# Patient Record
Sex: FEMALE | Race: OTHER | NOT HISPANIC OR LATINO | URBAN - METROPOLITAN AREA
[De-identification: names, ages, dates, MRNs, and addresses within clinical notes are randomized per-mention and may not be internally consistent; named-entity substitution may affect disease eponyms.]

---

## 2021-05-09 ENCOUNTER — INPATIENT (INPATIENT)
Facility: HOSPITAL | Age: 26
LOS: 3 days | Discharge: HOME | End: 2021-05-13
Attending: STUDENT IN AN ORGANIZED HEALTH CARE EDUCATION/TRAINING PROGRAM | Admitting: STUDENT IN AN ORGANIZED HEALTH CARE EDUCATION/TRAINING PROGRAM
Payer: MEDICAID

## 2021-05-09 VITALS
OXYGEN SATURATION: 100 % | RESPIRATION RATE: 16 BRPM | HEART RATE: 110 BPM | DIASTOLIC BLOOD PRESSURE: 71 MMHG | SYSTOLIC BLOOD PRESSURE: 117 MMHG | TEMPERATURE: 99 F

## 2021-05-09 RX ORDER — SODIUM CHLORIDE 9 MG/ML
1000 INJECTION INTRAMUSCULAR; INTRAVENOUS; SUBCUTANEOUS ONCE
Refills: 0 | Status: COMPLETED | OUTPATIENT
Start: 2021-05-09 | End: 2021-05-09

## 2021-05-09 RX ORDER — ACETAMINOPHEN 500 MG
975 TABLET ORAL ONCE
Refills: 0 | Status: COMPLETED | OUTPATIENT
Start: 2021-05-09 | End: 2021-05-09

## 2021-05-09 RX ADMIN — Medication 975 MILLIGRAM(S): at 23:23

## 2021-05-09 RX ADMIN — SODIUM CHLORIDE 1000 MILLILITER(S): 9 INJECTION INTRAMUSCULAR; INTRAVENOUS; SUBCUTANEOUS at 23:23

## 2021-05-09 NOTE — ED ADULT NURSE NOTE - OBJECTIVE STATEMENT
Patient received to room 9, A&OX4, ambulatory, c/o SOB and flank pain. States she received Carson and Carson vaccine 3 weeks ago and has since had worsening SOB. Also c/o L sided flank pain. Reports burning with urination, denies hematuria. Respirations even and unlabored, 100% on RA. VS as noted. 20G IV placed to R AC, labs drawn and sent. Medicated as per orders.

## 2021-05-09 NOTE — ED ADULT TRIAGE NOTE - CHIEF COMPLAINT QUOTE
generalized body aches and chills x 3 weeks 2/2 covid vaccine (J&J), took advil at 20:00 tonight with no relief, EKG in progress

## 2021-05-09 NOTE — ED ADULT NURSE NOTE - NSIMPLEMENTINTERV_GEN_ALL_ED
Implemented All Universal Safety Interventions:  [place] to call system. Call bell, personal items and telephone within reach. Instruct patient to call for assistance. Room bathroom lighting operational. Non-slip footwear when patient is off stretcher. Physically safe environment: no spills, clutter or unnecessary equipment. Stretcher in lowest position, wheels locked, appropriate side rails in place. (0) independent

## 2021-05-10 LAB
ALBUMIN SERPL ELPH-MCNC: 2.8 G/DL — LOW (ref 3.3–5)
ALBUMIN SERPL ELPH-MCNC: 3.5 G/DL — SIGNIFICANT CHANGE UP (ref 3.3–5)
ALP SERPL-CCNC: 139 U/L — HIGH (ref 40–120)
ALP SERPL-CCNC: 177 U/L — HIGH (ref 40–120)
ALT FLD-CCNC: 193 U/L — HIGH (ref 4–33)
ALT FLD-CCNC: 241 U/L — HIGH (ref 4–33)
ANION GAP SERPL CALC-SCNC: 13 MMOL/L — SIGNIFICANT CHANGE UP (ref 7–14)
ANION GAP SERPL CALC-SCNC: 13 MMOL/L — SIGNIFICANT CHANGE UP (ref 7–14)
APPEARANCE UR: ABNORMAL
APTT BLD: 30.5 SEC — SIGNIFICANT CHANGE UP (ref 27–36.3)
AST SERPL-CCNC: 73 U/L — HIGH (ref 4–32)
AST SERPL-CCNC: 92 U/L — HIGH (ref 4–32)
B PERT DNA SPEC QL NAA+PROBE: SIGNIFICANT CHANGE UP
BASOPHILS # BLD AUTO: 0 K/UL — SIGNIFICANT CHANGE UP (ref 0–0.2)
BASOPHILS # BLD AUTO: 0.02 K/UL — SIGNIFICANT CHANGE UP (ref 0–0.2)
BASOPHILS NFR BLD AUTO: 0 % — SIGNIFICANT CHANGE UP (ref 0–2)
BASOPHILS NFR BLD AUTO: 0.2 % — SIGNIFICANT CHANGE UP (ref 0–2)
BILIRUB SERPL-MCNC: 0.6 MG/DL — SIGNIFICANT CHANGE UP (ref 0.2–1.2)
BILIRUB SERPL-MCNC: 0.7 MG/DL — SIGNIFICANT CHANGE UP (ref 0.2–1.2)
BILIRUB UR-MCNC: NEGATIVE — SIGNIFICANT CHANGE UP
BLOOD GAS VENOUS COMPREHENSIVE RESULT: SIGNIFICANT CHANGE UP
BUN SERPL-MCNC: 10 MG/DL — SIGNIFICANT CHANGE UP (ref 7–23)
BUN SERPL-MCNC: 9 MG/DL — SIGNIFICANT CHANGE UP (ref 7–23)
C PNEUM DNA SPEC QL NAA+PROBE: SIGNIFICANT CHANGE UP
CALCIUM SERPL-MCNC: 8.1 MG/DL — LOW (ref 8.4–10.5)
CALCIUM SERPL-MCNC: 9.3 MG/DL — SIGNIFICANT CHANGE UP (ref 8.4–10.5)
CHLORIDE SERPL-SCNC: 103 MMOL/L — SIGNIFICANT CHANGE UP (ref 98–107)
CHLORIDE SERPL-SCNC: 110 MMOL/L — HIGH (ref 98–107)
CO2 SERPL-SCNC: 17 MMOL/L — LOW (ref 22–31)
CO2 SERPL-SCNC: 21 MMOL/L — LOW (ref 22–31)
COLOR SPEC: YELLOW — SIGNIFICANT CHANGE UP
CREAT SERPL-MCNC: 0.54 MG/DL — SIGNIFICANT CHANGE UP (ref 0.5–1.3)
CREAT SERPL-MCNC: 0.65 MG/DL — SIGNIFICANT CHANGE UP (ref 0.5–1.3)
DIFF PNL FLD: ABNORMAL
EOSINOPHIL # BLD AUTO: 0.25 K/UL — SIGNIFICANT CHANGE UP (ref 0–0.5)
EOSINOPHIL # BLD AUTO: 0.35 K/UL — SIGNIFICANT CHANGE UP (ref 0–0.5)
EOSINOPHIL NFR BLD AUTO: 2.7 % — SIGNIFICANT CHANGE UP (ref 0–6)
EOSINOPHIL NFR BLD AUTO: 5.3 % — SIGNIFICANT CHANGE UP (ref 0–6)
FLUAV SUBTYP SPEC NAA+PROBE: SIGNIFICANT CHANGE UP
FLUBV RNA SPEC QL NAA+PROBE: SIGNIFICANT CHANGE UP
GLUCOSE SERPL-MCNC: 122 MG/DL — HIGH (ref 70–99)
GLUCOSE SERPL-MCNC: 141 MG/DL — HIGH (ref 70–99)
GLUCOSE UR QL: NEGATIVE — SIGNIFICANT CHANGE UP
HADV DNA SPEC QL NAA+PROBE: SIGNIFICANT CHANGE UP
HCG SERPL-ACNC: <5 MIU/ML — SIGNIFICANT CHANGE UP
HCOV 229E RNA SPEC QL NAA+PROBE: SIGNIFICANT CHANGE UP
HCOV HKU1 RNA SPEC QL NAA+PROBE: SIGNIFICANT CHANGE UP
HCOV NL63 RNA SPEC QL NAA+PROBE: SIGNIFICANT CHANGE UP
HCOV OC43 RNA SPEC QL NAA+PROBE: SIGNIFICANT CHANGE UP
HCT VFR BLD CALC: 34.2 % — LOW (ref 34.5–45)
HCT VFR BLD CALC: 36.4 % — SIGNIFICANT CHANGE UP (ref 34.5–45)
HGB BLD-MCNC: 11 G/DL — LOW (ref 11.5–15.5)
HGB BLD-MCNC: 11.3 G/DL — LOW (ref 11.5–15.5)
HMPV RNA SPEC QL NAA+PROBE: SIGNIFICANT CHANGE UP
HPIV1 RNA SPEC QL NAA+PROBE: SIGNIFICANT CHANGE UP
HPIV2 RNA SPEC QL NAA+PROBE: SIGNIFICANT CHANGE UP
HPIV3 RNA SPEC QL NAA+PROBE: SIGNIFICANT CHANGE UP
HPIV4 RNA SPEC QL NAA+PROBE: SIGNIFICANT CHANGE UP
IANC: 4.82 K/UL — SIGNIFICANT CHANGE UP (ref 1.5–8.5)
IANC: 7.25 K/UL — SIGNIFICANT CHANGE UP (ref 1.5–8.5)
IMM GRANULOCYTES NFR BLD AUTO: 0.5 % — SIGNIFICANT CHANGE UP (ref 0–1.5)
INR BLD: 1.43 RATIO — HIGH (ref 0.88–1.16)
KETONES UR-MCNC: NEGATIVE — SIGNIFICANT CHANGE UP
LEUKOCYTE ESTERASE UR-ACNC: ABNORMAL
LYMPHOCYTES # BLD AUTO: 0.82 K/UL — LOW (ref 1–3.3)
LYMPHOCYTES # BLD AUTO: 0.97 K/UL — LOW (ref 1–3.3)
LYMPHOCYTES # BLD AUTO: 10.4 % — LOW (ref 13–44)
LYMPHOCYTES # BLD AUTO: 12.4 % — LOW (ref 13–44)
MCHC RBC-ENTMCNC: 25.6 PG — LOW (ref 27–34)
MCHC RBC-ENTMCNC: 26.4 PG — LOW (ref 27–34)
MCHC RBC-ENTMCNC: 31 GM/DL — LOW (ref 32–36)
MCHC RBC-ENTMCNC: 32.2 GM/DL — SIGNIFICANT CHANGE UP (ref 32–36)
MCV RBC AUTO: 82 FL — SIGNIFICANT CHANGE UP (ref 80–100)
MCV RBC AUTO: 82.5 FL — SIGNIFICANT CHANGE UP (ref 80–100)
MONOCYTES # BLD AUTO: 0.12 K/UL — SIGNIFICANT CHANGE UP (ref 0–0.9)
MONOCYTES # BLD AUTO: 0.79 K/UL — SIGNIFICANT CHANGE UP (ref 0–0.9)
MONOCYTES NFR BLD AUTO: 1.8 % — LOW (ref 2–14)
MONOCYTES NFR BLD AUTO: 8.5 % — SIGNIFICANT CHANGE UP (ref 2–14)
NEUTROPHILS # BLD AUTO: 5.3 K/UL — SIGNIFICANT CHANGE UP (ref 1.8–7.4)
NEUTROPHILS # BLD AUTO: 7.25 K/UL — SIGNIFICANT CHANGE UP (ref 1.8–7.4)
NEUTROPHILS NFR BLD AUTO: 70.8 % — SIGNIFICANT CHANGE UP (ref 43–77)
NEUTROPHILS NFR BLD AUTO: 77.7 % — HIGH (ref 43–77)
NITRITE UR-MCNC: POSITIVE
NRBC # BLD: 0 /100 WBCS — SIGNIFICANT CHANGE UP
NRBC # FLD: 0 K/UL — SIGNIFICANT CHANGE UP
PH UR: 7.5 — SIGNIFICANT CHANGE UP (ref 5–8)
PLATELET # BLD AUTO: 160 K/UL — SIGNIFICANT CHANGE UP (ref 150–400)
PLATELET # BLD AUTO: 196 K/UL — SIGNIFICANT CHANGE UP (ref 150–400)
POTASSIUM SERPL-MCNC: 3.3 MMOL/L — LOW (ref 3.5–5.3)
POTASSIUM SERPL-MCNC: 3.7 MMOL/L — SIGNIFICANT CHANGE UP (ref 3.5–5.3)
POTASSIUM SERPL-SCNC: 3.3 MMOL/L — LOW (ref 3.5–5.3)
POTASSIUM SERPL-SCNC: 3.7 MMOL/L — SIGNIFICANT CHANGE UP (ref 3.5–5.3)
PROT SERPL-MCNC: 6.1 G/DL — SIGNIFICANT CHANGE UP (ref 6–8.3)
PROT SERPL-MCNC: 7.3 G/DL — SIGNIFICANT CHANGE UP (ref 6–8.3)
PROT UR-MCNC: ABNORMAL
PROTHROM AB SERPL-ACNC: 16.2 SEC — HIGH (ref 10.6–13.6)
RAPID RVP RESULT: SIGNIFICANT CHANGE UP
RBC # BLD: 4.17 M/UL — SIGNIFICANT CHANGE UP (ref 3.8–5.2)
RBC # BLD: 4.41 M/UL — SIGNIFICANT CHANGE UP (ref 3.8–5.2)
RBC # FLD: 21.2 % — HIGH (ref 10.3–14.5)
RBC # FLD: 21.3 % — HIGH (ref 10.3–14.5)
RSV RNA SPEC QL NAA+PROBE: SIGNIFICANT CHANGE UP
RV+EV RNA SPEC QL NAA+PROBE: SIGNIFICANT CHANGE UP
SARS-COV-2 RNA SPEC QL NAA+PROBE: SIGNIFICANT CHANGE UP
SODIUM SERPL-SCNC: 137 MMOL/L — SIGNIFICANT CHANGE UP (ref 135–145)
SODIUM SERPL-SCNC: 140 MMOL/L — SIGNIFICANT CHANGE UP (ref 135–145)
SP GR SPEC: 1.01 — SIGNIFICANT CHANGE UP (ref 1.01–1.02)
UROBILINOGEN FLD QL: SIGNIFICANT CHANGE UP
WBC # BLD: 6.58 K/UL — SIGNIFICANT CHANGE UP (ref 3.8–10.5)
WBC # BLD: 9.33 K/UL — SIGNIFICANT CHANGE UP (ref 3.8–10.5)
WBC # FLD AUTO: 6.58 K/UL — SIGNIFICANT CHANGE UP (ref 3.8–10.5)
WBC # FLD AUTO: 9.33 K/UL — SIGNIFICANT CHANGE UP (ref 3.8–10.5)

## 2021-05-10 PROCEDURE — 74177 CT ABD & PELVIS W/CONTRAST: CPT | Mod: 26

## 2021-05-10 PROCEDURE — 71275 CT ANGIOGRAPHY CHEST: CPT | Mod: 26

## 2021-05-10 PROCEDURE — 76705 ECHO EXAM OF ABDOMEN: CPT | Mod: 26

## 2021-05-10 RX ORDER — IBUPROFEN 200 MG
400 TABLET ORAL ONCE
Refills: 0 | Status: COMPLETED | OUTPATIENT
Start: 2021-05-10 | End: 2021-05-10

## 2021-05-10 RX ORDER — ACETAMINOPHEN 500 MG
975 TABLET ORAL ONCE
Refills: 0 | Status: COMPLETED | OUTPATIENT
Start: 2021-05-10 | End: 2021-05-10

## 2021-05-10 RX ORDER — SODIUM CHLORIDE 9 MG/ML
1000 INJECTION INTRAMUSCULAR; INTRAVENOUS; SUBCUTANEOUS
Refills: 0 | Status: DISCONTINUED | OUTPATIENT
Start: 2021-05-10 | End: 2021-05-10

## 2021-05-10 RX ORDER — KETOROLAC TROMETHAMINE 30 MG/ML
30 SYRINGE (ML) INJECTION EVERY 6 HOURS
Refills: 0 | Status: DISCONTINUED | OUTPATIENT
Start: 2021-05-10 | End: 2021-05-12

## 2021-05-10 RX ORDER — SODIUM CHLORIDE 9 MG/ML
1000 INJECTION INTRAMUSCULAR; INTRAVENOUS; SUBCUTANEOUS ONCE
Refills: 0 | Status: COMPLETED | OUTPATIENT
Start: 2021-05-10 | End: 2021-05-10

## 2021-05-10 RX ORDER — CEFTRIAXONE 500 MG/1
1000 INJECTION, POWDER, FOR SOLUTION INTRAMUSCULAR; INTRAVENOUS ONCE
Refills: 0 | Status: COMPLETED | OUTPATIENT
Start: 2021-05-10 | End: 2021-05-10

## 2021-05-10 RX ORDER — POTASSIUM CHLORIDE 20 MEQ
40 PACKET (EA) ORAL ONCE
Refills: 0 | Status: COMPLETED | OUTPATIENT
Start: 2021-05-10 | End: 2021-05-10

## 2021-05-10 RX ORDER — ACETAMINOPHEN 500 MG
650 TABLET ORAL EVERY 4 HOURS
Refills: 0 | Status: DISCONTINUED | OUTPATIENT
Start: 2021-05-10 | End: 2021-05-12

## 2021-05-10 RX ORDER — CEFTRIAXONE 500 MG/1
1000 INJECTION, POWDER, FOR SOLUTION INTRAMUSCULAR; INTRAVENOUS EVERY 12 HOURS
Refills: 0 | Status: DISCONTINUED | OUTPATIENT
Start: 2021-05-10 | End: 2021-05-11

## 2021-05-10 RX ORDER — SODIUM CHLORIDE 9 MG/ML
1000 INJECTION, SOLUTION INTRAVENOUS
Refills: 0 | Status: DISCONTINUED | OUTPATIENT
Start: 2021-05-10 | End: 2021-05-12

## 2021-05-10 RX ADMIN — Medication 975 MILLIGRAM(S): at 21:15

## 2021-05-10 RX ADMIN — Medication 650 MILLIGRAM(S): at 13:21

## 2021-05-10 RX ADMIN — Medication 650 MILLIGRAM(S): at 14:36

## 2021-05-10 RX ADMIN — CEFTRIAXONE 1000 MILLIGRAM(S): 500 INJECTION, POWDER, FOR SOLUTION INTRAMUSCULAR; INTRAVENOUS at 13:50

## 2021-05-10 RX ADMIN — Medication 30 MILLIGRAM(S): at 13:09

## 2021-05-10 RX ADMIN — Medication 40 MILLIEQUIVALENT(S): at 08:28

## 2021-05-10 RX ADMIN — SODIUM CHLORIDE 125 MILLILITER(S): 9 INJECTION, SOLUTION INTRAVENOUS at 13:21

## 2021-05-10 RX ADMIN — Medication 400 MILLIGRAM(S): at 03:34

## 2021-05-10 RX ADMIN — SODIUM CHLORIDE 125 MILLILITER(S): 9 INJECTION, SOLUTION INTRAVENOUS at 21:16

## 2021-05-10 RX ADMIN — SODIUM CHLORIDE 1000 MILLILITER(S): 9 INJECTION INTRAMUSCULAR; INTRAVENOUS; SUBCUTANEOUS at 03:46

## 2021-05-10 RX ADMIN — CEFTRIAXONE 100 MILLIGRAM(S): 500 INJECTION, POWDER, FOR SOLUTION INTRAMUSCULAR; INTRAVENOUS at 13:20

## 2021-05-10 RX ADMIN — Medication 975 MILLIGRAM(S): at 22:40

## 2021-05-10 RX ADMIN — Medication 30 MILLIGRAM(S): at 20:37

## 2021-05-10 RX ADMIN — CEFTRIAXONE 100 MILLIGRAM(S): 500 INJECTION, POWDER, FOR SOLUTION INTRAMUSCULAR; INTRAVENOUS at 02:29

## 2021-05-10 RX ADMIN — Medication 30 MILLIGRAM(S): at 13:30

## 2021-05-10 RX ADMIN — SODIUM CHLORIDE 125 MILLILITER(S): 9 INJECTION INTRAMUSCULAR; INTRAVENOUS; SUBCUTANEOUS at 05:37

## 2021-05-10 RX ADMIN — Medication 30 MILLIGRAM(S): at 19:09

## 2021-05-10 NOTE — ED PROVIDER NOTE - NS ED ROS FT
REVIEW OF SYSTEMS:  General: no fever, no chills  HEENT: no headache, no vision changes  Cardiac: +chest pain, no palpitations  Respiratory: no cough, +shortness of breath  Gastrointestinal: +abdominal pain, no nausea, no vomiting, no diarrhea  Genitourinary: no hematuria, +dysuria, no urinary frequency  Extremities: no extremity swelling, no extremity pain  Neuro: no focal weakness, no numbness/tingling of the extremities, no decreased sensation  Heme: no easy bleeding, no easy bruising  Skin: no jaundice,  no rashes, no lesions  All other ROS as documented in HPI  -Pavan Whyte, PGY-3

## 2021-05-10 NOTE — ED CDU PROVIDER INITIAL DAY NOTE - DETAILS
Patient is a 26 y.o female with no known PMHx who presents to ED c/o chills, subjective fevers, Lt side flank pain and dysuria. Pt also mentioned CP to ED, pt had recent J&J vaccine 3 weeks ago. Pt arrived tachycardic 110 with rectal temp of 101F. Pt seen and worked up in ED; no leukocytosis. UA+for UTI. CTA chest negative for PE, CT a/p + for Lt side pyelonephritis and ureteritis. Pt given rocephin and IVF in ED. Transferred to CDU for; IVF, IV abx, AM labs, Antipyretic and analgesic prn, vitals q4 and frequent re-evals.

## 2021-05-10 NOTE — ED CDU PROVIDER INITIAL DAY NOTE - OBJECTIVE STATEMENT
HPI- Patient is a 26 y.o female with no known PMHx who presents to ED c/o chills, subjective fevers, Lt side flank pain and dysuria. As per patient states that for past few weeks she has had intermittent chills with subjective fevers and fatigue. Then 2 days ago noted +dysuria and Lt side flank pain.  Pt also mentioned CP to ED team but denies currently, pt had recent J&J vaccine 3 weeks ago. Pt arrived tachycardic 110 with rectal temp of 101F. Pt seen and worked up in ED; no leukocytosis. UA+for UTI. CTA chest negative for PE, CT a/p + for Lt side pyelonephritis and ureteritis. Pt given rocephin and IVF in ED. Transferred to CDU for; IVF, IV abx, AM labs, Antipyretic and analgesic prn, vitals q4 and frequent re-evals. Pt denies n/v/d, URI sx, covid exposures, vaginal discharge/vaginal bleeding, hx of STD's, pelvic pain, recent travel, sob, cp or any other complaints.

## 2021-05-10 NOTE — ED CDU PROVIDER INITIAL DAY NOTE - ATTENDING CONTRIBUTION TO CARE
Rec'd s/o from Dr Bloch.  "Patient examined, mildly ill appearing , NAD, HEENT nml heart s1s2,lungs clear, abd soft tender LUQ and left CVA. extrem no rashes, no calf tenderness, neuro nml."    26F s/p J &J vaccine, c/o HA myalgia, CP SOB, LUQ ttp dysuria.  Fever 101.  LUQ ttp.  awaiting labs and CTA.  CTA shows possible pyelo.  Plan rx abx, fluids, reass in AM

## 2021-05-10 NOTE — ED PROVIDER NOTE - PHYSICAL EXAMINATION
General: Well developed, well nourished  HEENT: Normocephalic and atraumatic, Trachea midline.   Cardiac: Normal S1 and S2 w/ RRR. No MRG.  Pulmonary: CTA bilaterally. No increased WOB.   Abdominal: Soft, LUQ TTP, No organomegaly. L CVA tenderness   Neurologic: No focal sensory or motor deficits.  Musculoskeletal: No limited ROM.  Vascular: Warm and well perfused  Skin: Color appropriate for race.   Psychiatric: Appropriate mood and affect. No apparent risk to self or others.  Pavan Whyte M.D. PGY-3

## 2021-05-10 NOTE — ED PROVIDER NOTE - OBJECTIVE STATEMENT
26F no sig PMH presents with body aches. Pt states she received the J&J vaccine 3 weeks ago. States since then has been having fevers, body aches and malaise. States a few days ago she was having severe dysuria, Today also started experiencing a chest pain in mid chest and shortness of breath. CP is pleuritic. No n/v/d. Not on OCPs, Had hormonal IUD removed recently.

## 2021-05-10 NOTE — ED PROVIDER NOTE - ATTENDING CONTRIBUTION TO CARE
DR. BLOCH, ATTENDING MD-  I performed a face to face bedside interview with patient regarding history of present illness, review of symptoms and past medical history. I completed an independent physical exam.  I have discussed patient's plan of care with the resident.  Patient examined, mildly ill appearing , NAD, HEENT nml heart s1s2,lungs clear, abd soft tender LUQ and left CVA. extrem no rashes, no calf tenderness, neuro nml

## 2021-05-10 NOTE — ED PROVIDER NOTE - CLINICAL SUMMARY MEDICAL DECISION MAKING FREE TEXT BOX
26F presents with multiple complaints. Exam notable for fever and LUQ abd pain and CVA tenderness. Concern is that with recent J&J vaccine pt may be developing blood clots - pleuritic CP and SOB may be PE. Other rare reactions included intrabdominal clots specifically with this patient some concern for splenic infarct or clots. May also be pyelonephritis which would explain fevers, abd/CVA tenderness (SOB/CP referred?) will obtain labs and UA, Will require CT to r/o severe pathology

## 2021-05-10 NOTE — ED CDU PROVIDER INITIAL DAY NOTE - PHYSICAL EXAMINATION
Vital signs reviewed.   CONSTITUTIONAL: Well-appearing; well-nourished; in no apparent distress. Non-toxic appearing.   HEAD: Normocephalic, atraumatic.  EYES: conjunctiva and sclera WNL.  CARD: Normal S1, S2;   RESP: Normal chest excursion with respiration; breath sounds clear and equal bilaterally; no wheezes, rhonchi, or rales.  ABD/GI: soft, non-distended; +Lt CVAT.   EXT/MS: moves all extremities  SKIN: Normal for age and race  NEURO: Awake, alert, oriented x 3, no gross deficits

## 2021-05-11 DIAGNOSIS — Z29.9 ENCOUNTER FOR PROPHYLACTIC MEASURES, UNSPECIFIED: ICD-10-CM

## 2021-05-11 DIAGNOSIS — R74.8 ABNORMAL LEVELS OF OTHER SERUM ENZYMES: ICD-10-CM

## 2021-05-11 DIAGNOSIS — N12 TUBULO-INTERSTITIAL NEPHRITIS, NOT SPECIFIED AS ACUTE OR CHRONIC: ICD-10-CM

## 2021-05-11 DIAGNOSIS — Z98.891 HISTORY OF UTERINE SCAR FROM PREVIOUS SURGERY: Chronic | ICD-10-CM

## 2021-05-11 DIAGNOSIS — R07.81 PLEURODYNIA: ICD-10-CM

## 2021-05-11 DIAGNOSIS — A41.51 SEPSIS DUE TO ESCHERICHIA COLI [E. COLI]: ICD-10-CM

## 2021-05-11 DIAGNOSIS — D64.9 ANEMIA, UNSPECIFIED: ICD-10-CM

## 2021-05-11 LAB
ALBUMIN SERPL ELPH-MCNC: 3.1 G/DL — LOW (ref 3.3–5)
ALP SERPL-CCNC: 144 U/L — HIGH (ref 40–120)
ALT FLD-CCNC: 148 U/L — HIGH (ref 4–33)
ANION GAP SERPL CALC-SCNC: 12 MMOL/L — SIGNIFICANT CHANGE UP (ref 7–14)
AST SERPL-CCNC: 43 U/L — HIGH (ref 4–32)
BASOPHILS # BLD AUTO: 0.02 K/UL — SIGNIFICANT CHANGE UP (ref 0–0.2)
BASOPHILS NFR BLD AUTO: 0.2 % — SIGNIFICANT CHANGE UP (ref 0–2)
BILIRUB SERPL-MCNC: 0.3 MG/DL — SIGNIFICANT CHANGE UP (ref 0.2–1.2)
BLOOD GAS VENOUS COMPREHENSIVE RESULT: SIGNIFICANT CHANGE UP
BUN SERPL-MCNC: 8 MG/DL — SIGNIFICANT CHANGE UP (ref 7–23)
CALCIUM SERPL-MCNC: 9.2 MG/DL — SIGNIFICANT CHANGE UP (ref 8.4–10.5)
CHLORIDE SERPL-SCNC: 104 MMOL/L — SIGNIFICANT CHANGE UP (ref 98–107)
CO2 SERPL-SCNC: 21 MMOL/L — LOW (ref 22–31)
CREAT SERPL-MCNC: 0.59 MG/DL — SIGNIFICANT CHANGE UP (ref 0.5–1.3)
EOSINOPHIL # BLD AUTO: 0.17 K/UL — SIGNIFICANT CHANGE UP (ref 0–0.5)
EOSINOPHIL NFR BLD AUTO: 1.7 % — SIGNIFICANT CHANGE UP (ref 0–6)
GLUCOSE SERPL-MCNC: 107 MG/DL — HIGH (ref 70–99)
HCT VFR BLD CALC: 34.6 % — SIGNIFICANT CHANGE UP (ref 34.5–45)
HGB BLD-MCNC: 10.7 G/DL — LOW (ref 11.5–15.5)
IANC: 7.02 K/UL — SIGNIFICANT CHANGE UP (ref 1.5–8.5)
IMM GRANULOCYTES NFR BLD AUTO: 0.6 % — SIGNIFICANT CHANGE UP (ref 0–1.5)
LYMPHOCYTES # BLD AUTO: 1.31 K/UL — SIGNIFICANT CHANGE UP (ref 1–3.3)
LYMPHOCYTES # BLD AUTO: 13 % — SIGNIFICANT CHANGE UP (ref 13–44)
MCHC RBC-ENTMCNC: 25.3 PG — LOW (ref 27–34)
MCHC RBC-ENTMCNC: 30.9 GM/DL — LOW (ref 32–36)
MCV RBC AUTO: 81.8 FL — SIGNIFICANT CHANGE UP (ref 80–100)
MONOCYTES # BLD AUTO: 1.46 K/UL — HIGH (ref 0–0.9)
MONOCYTES NFR BLD AUTO: 14.5 % — HIGH (ref 2–14)
NEUTROPHILS # BLD AUTO: 7.02 K/UL — SIGNIFICANT CHANGE UP (ref 1.8–7.4)
NEUTROPHILS NFR BLD AUTO: 70 % — SIGNIFICANT CHANGE UP (ref 43–77)
NRBC # BLD: 0 /100 WBCS — SIGNIFICANT CHANGE UP
NRBC # FLD: 0 K/UL — SIGNIFICANT CHANGE UP
PLATELET # BLD AUTO: 202 K/UL — SIGNIFICANT CHANGE UP (ref 150–400)
POTASSIUM SERPL-MCNC: 3.7 MMOL/L — SIGNIFICANT CHANGE UP (ref 3.5–5.3)
POTASSIUM SERPL-SCNC: 3.7 MMOL/L — SIGNIFICANT CHANGE UP (ref 3.5–5.3)
PROT SERPL-MCNC: 6.7 G/DL — SIGNIFICANT CHANGE UP (ref 6–8.3)
RBC # BLD: 4.23 M/UL — SIGNIFICANT CHANGE UP (ref 3.8–5.2)
RBC # FLD: 21.5 % — HIGH (ref 10.3–14.5)
SODIUM SERPL-SCNC: 137 MMOL/L — SIGNIFICANT CHANGE UP (ref 135–145)
WBC # BLD: 10.04 K/UL — SIGNIFICANT CHANGE UP (ref 3.8–10.5)
WBC # FLD AUTO: 10.04 K/UL — SIGNIFICANT CHANGE UP (ref 3.8–10.5)

## 2021-05-11 RX ORDER — CEFTRIAXONE 500 MG/1
1000 INJECTION, POWDER, FOR SOLUTION INTRAMUSCULAR; INTRAVENOUS EVERY 24 HOURS
Refills: 0 | Status: DISCONTINUED | OUTPATIENT
Start: 2021-05-12 | End: 2021-05-13

## 2021-05-11 RX ADMIN — CEFTRIAXONE 100 MILLIGRAM(S): 500 INJECTION, POWDER, FOR SOLUTION INTRAMUSCULAR; INTRAVENOUS at 13:24

## 2021-05-11 RX ADMIN — Medication 650 MILLIGRAM(S): at 11:00

## 2021-05-11 RX ADMIN — SODIUM CHLORIDE 125 MILLILITER(S): 9 INJECTION, SOLUTION INTRAVENOUS at 05:50

## 2021-05-11 RX ADMIN — Medication 30 MILLIGRAM(S): at 06:15

## 2021-05-11 RX ADMIN — Medication 650 MILLIGRAM(S): at 21:38

## 2021-05-11 RX ADMIN — Medication 650 MILLIGRAM(S): at 17:52

## 2021-05-11 RX ADMIN — Medication 30 MILLIGRAM(S): at 05:48

## 2021-05-11 RX ADMIN — CEFTRIAXONE 100 MILLIGRAM(S): 500 INJECTION, POWDER, FOR SOLUTION INTRAMUSCULAR; INTRAVENOUS at 01:55

## 2021-05-11 RX ADMIN — SODIUM CHLORIDE 125 MILLILITER(S): 9 INJECTION, SOLUTION INTRAVENOUS at 21:38

## 2021-05-11 RX ADMIN — Medication 650 MILLIGRAM(S): at 10:26

## 2021-05-11 RX ADMIN — Medication 30 MILLIGRAM(S): at 17:48

## 2021-05-11 NOTE — ED CDU PROVIDER DISPOSITION NOTE - ATTENDING CONTRIBUTION TO CARE
27yo F no PMHx who presented to ED with Lt side flank pain and dysuria. Pt also mentioned CP to ED, pt had recent J&J vaccine 3 weeks ago. Pt arrived tachycardic 110 with rectal temp of 101F. Pt seen and worked up in ED; no leukocytosis, bandemia 9.7% bands, UA+for UTI. CTA chest negative for PE, CT a/p + for Lt side pyelonephritis and ureteritis. Pt given rocephin and IVF in ED.     Dispo-ed to CDU for IVF, IV abx, labs, Antipyretic and analgesic prn, vitals q4 and frequent re-evals.  After about 2 days in CDU patient continues to have fever and flank pain. urine cultures pending.  will admit for further management to f/u urine and blood cx

## 2021-05-11 NOTE — H&P ADULT - NSHPPHYSICALEXAM_GEN_ALL_CORE
Vital Signs Last 24 Hrs  T(C): 37.2 (11 May 2021 21:33), Max: 38.4 (11 May 2021 10:38)  T(F): 99 (11 May 2021 21:33), Max: 101.1 (11 May 2021 10:38)  HR: 77 (11 May 2021 21:33) (77 - 101)  BP: 116/79 (11 May 2021 21:33) (99/60 - 116/79)  BP(mean): --  RR: 18 (11 May 2021 21:33) (16 - 18)  SpO2: 99% (11 May 2021 21:33) (98% - 100%)

## 2021-05-11 NOTE — H&P ADULT - NSHPSOCIALHISTORY_GEN_ALL_CORE
Patient lives with  and 2 kids. Denies hx of smoking cigarettes or other drug use. Denies drinking alcohol.

## 2021-05-11 NOTE — ED ADULT NURSE REASSESSMENT NOTE - NS ED NURSE REASSESS COMMENT FT1
patient aaox4. ambulatory. came in with malaise and fever after receiving J&J vaccine. currently in nad. reports mild abdominal pain. denies dysuria, increased frequency and urgency, hematuria, chills, dizziness, weakness, lightheadedness, n/v/d, chest pain, palpitations, sob. denies any abnormal abscesses or discharge from genitals. respirations even and unlabored on room air. lr at 125ml/hr to left ac #20g.. will continue to monitor.

## 2021-05-11 NOTE — ED CDU PROVIDER SUBSEQUENT DAY NOTE - HISTORY
26 y.o female with no known PMHx who presents to ED c/o chills, subjective fevers, Lt side flank pain and dysuria. Pt also mentioned CP to ED, pt had recent J&J vaccine 3 weeks ago. Pt arrived tachycardic 110 with rectal temp of 101F. Pt seen and worked up in ED; no leukocytosis. UA+for UTI. CTA chest negative for PE, CT a/p + for Lt side pyelonephritis and ureteritis. Pt given rocephin and IVF in ED. Transferred to CDU for; IVF, IV abx, AM labs, Antipyretic and analgesic prn, vitals q4 and frequent re-evals.

## 2021-05-11 NOTE — H&P ADULT - PROBLEM SELECTOR PLAN 2
- patient meets SIRS criteria with fever and tachycardia  - Ucx + E.coli   - Continue ABx   - Currently on IV NS @125cc/hr   - vitals q 4 hours  - f/U cultures

## 2021-05-11 NOTE — H&P ADULT - NSICDXFAMILYHX_GEN_ALL_CORE_FT
FAMILY HISTORY:  No pertinent family history in first degree relatives     No pertinent family history in first degree relatives

## 2021-05-11 NOTE — H&P ADULT - NSHPLABSRESULTS_GEN_ALL_CORE
10.7   10.04 )-----------( 202      ( 11 May 2021 07:12 )             34.6   05-11    137  |  104  |  8   ----------------------------<  107<H>  3.7   |  21<L>  |  0.59    Ca    9.2      11 May 2021 07:12    TPro  6.7  /  Alb  3.1<L>  /  TBili  0.3  /  DBili  x   /  AST  43<H>  /  ALT  148<H>  /  AlkPhos  144<H>  05-11    Urinalysis (05.10.21 @ 00:39)    Glucose Qualitative, Urine: Negative    Blood, Urine: Small    pH Urine: 7.5    Color: Yellow    Urine Appearance: Slightly Turbid    Bilirubin: Negative    Ketone - Urine: Negative    Specific Gravity: 1.015    Protein, Urine: 30 mg/dL    Urobilinogen: <2 mg/dL    Nitrite: Positive    Leukocyte Esterase Concentration: Large     CT Abdomen and Pelvis w/ IV Cont (05.10.21 @ 01:52) >        INTERPRETATION:  CLINICAL INFORMATION: Status post Carson and Carson vaccine, pleuritic chest pain and left upper quadrant abdominal pain    COMPARISON: None.    CONTRAST/COMPLICATIONS:  IV Contrast: Omnipaque 350  90 cc administered   10 cc discarded  Oral Contrast: NONE  Complications: None reported at time of study completion    PROCEDURE:  CT Angiography of the Chest was performed followed by portal venous phase imaging of the Abdomen and Pelvis.  Sagittal and coronal reformats were performed as well as 3D (MIP) reconstructions.    FINDINGS:  CHEST:  LUNGS AND LARGE AIRWAYS: Patent central airways. Minimal bibasilar subsegmental atelectasis. Clear lungs.  PLEURA: No pleural effusion.  VESSELS: No central, lobar, segmental or subsegmental PE. Main pulmonary artery normal in caliber.  HEART: Heart size is normal. No pericardial effusion.  MEDIASTINUM AND RENEE: No lymphadenopathy.  CHEST WALL AND LOWER NECK: Within normal limits.    ABDOMEN AND PELVIS:  LIVER: Within normal limits.  BILE DUCTS: Normal caliber.  GALLBLADDER: Contracted gallbladder.  SPLEEN: Within normal limits.  PANCREAS: Within normal limits.  ADRENALS: Within normal limits.  KIDNEYS/URETERS: Heterogeneous enhancement of the left kidney with striated nephrogram and associated mild perinephric stranding. Urothelial thickening and enhancement of the left ureter to the level of the bladder without obstructing stone identified. Right kidney within normal limits.    BLADDER: Thick-walled but under distended.  REPRODUCTIVE ORGANS: Uterus and adnexa within normal limits.    BOWEL: No bowel obstruction. Appendix is normal.  PERITONEUM: No ascites.  VESSELS: Within normal limits.  RETROPERITONEUM/LYMPH NODES: No lymphadenopathy.  ABDOMINAL WALL: Within normal limits.  BONES: Within normal limits.    IMPRESSION:    1. No pulmonary embolism.  2. Left pyelonephritis and ureteritis. Correlation with urinalysis is recommended. 10.7   10.04 )-----------( 202      ( 11 May 2021 07:12 )             34.6   05-11    137  |  104  |  8   ----------------------------<  107<H>  3.7   |  21<L>  |  0.59    Ca    9.2      11 May 2021 07:12    TPro  6.7  /  Alb  3.1<L>  /  TBili  0.3  /  DBili  x   /  AST  43<H>  /  ALT  148<H>  /  AlkPhos  144<H>  05-11    Urinalysis (05.10.21 @ 00:39)    Glucose Qualitative, Urine: Negative    Blood, Urine: Small    pH Urine: 7.5    Color: Yellow    Urine Appearance: Slightly Turbid    Bilirubin: Negative    Ketone - Urine: Negative    Specific Gravity: 1.015    Protein, Urine: 30 mg/dL    Urobilinogen: <2 mg/dL    Nitrite: Positive    Leukocyte Esterase Concentration: Large      Culture - Urine (05.10.21 @ 08:44)    Specimen Source: .Urine Clean Catch (Midstream)    Culture Results:   >100,000 CFU/ml Escherichia coli     CT Abdomen and Pelvis w/ IV Cont (05.10.21 @ 01:52) >        INTERPRETATION:  CLINICAL INFORMATION: Status post Carson and Carson vaccine, pleuritic chest pain and left upper quadrant abdominal pain    COMPARISON: None.    CONTRAST/COMPLICATIONS:  IV Contrast: Omnipaque 350  90 cc administered   10 cc discarded  Oral Contrast: NONE  Complications: None reported at time of study completion    PROCEDURE:  CT Angiography of the Chest was performed followed by portal venous phase imaging of the Abdomen and Pelvis.  Sagittal and coronal reformats were performed as well as 3D (MIP) reconstructions.    FINDINGS:  CHEST:  LUNGS AND LARGE AIRWAYS: Patent central airways. Minimal bibasilar subsegmental atelectasis. Clear lungs.  PLEURA: No pleural effusion.  VESSELS: No central, lobar, segmental or subsegmental PE. Main pulmonary artery normal in caliber.  HEART: Heart size is normal. No pericardial effusion.  MEDIASTINUM AND RENEE: No lymphadenopathy.  CHEST WALL AND LOWER NECK: Within normal limits.    ABDOMEN AND PELVIS:  LIVER: Within normal limits.  BILE DUCTS: Normal caliber.  GALLBLADDER: Contracted gallbladder.  SPLEEN: Within normal limits.  PANCREAS: Within normal limits.  ADRENALS: Within normal limits.  KIDNEYS/URETERS: Heterogeneous enhancement of the left kidney with striated nephrogram and associated mild perinephric stranding. Urothelial thickening and enhancement of the left ureter to the level of the bladder without obstructing stone identified. Right kidney within normal limits.    BLADDER: Thick-walled but under distended.  REPRODUCTIVE ORGANS: Uterus and adnexa within normal limits.    BOWEL: No bowel obstruction. Appendix is normal.  PERITONEUM: No ascites.  VESSELS: Within normal limits.  RETROPERITONEUM/LYMPH NODES: No lymphadenopathy.  ABDOMINAL WALL: Within normal limits.  BONES: Within normal limits.    IMPRESSION:    1. No pulmonary embolism.  2. Left pyelonephritis and ureteritis. Correlation with urinalysis is recommended.

## 2021-05-11 NOTE — H&P ADULT - NEGATIVE OPHTHALMOLOGIC SYMPTOMS
no diplopia/no blurred vision L/no blurred vision R/no pain L/no pain R/no loss of vision L/no loss of vision R

## 2021-05-11 NOTE — ED CDU PROVIDER SUBSEQUENT DAY NOTE - PROGRESS NOTE DETAILS
nicole myers: pt rested comfortably in bed all night, getting abx, fluids, will reasses 25 y/o F with no known PMHx who presented to ED with Lt side flank pain and dysuria. Pt also mentioned CP to ED, pt had recent J&J vaccine 3 weeks ago. Pt arrived tachycardic 110 with rectal temp of 101F. Pt seen and worked up in ED; no leukocytosis, bandemia 9.7% bands, UA+for UTI. CTA chest negative for PE, CT a/p + for Lt side pyelonephritis and ureteritis. Pt given rocephin and IVF in ED. Transferred to CDU for; IVF, IV abx, AM labs, Antipyretic and analgesic prn, vitals q4 and frequent re-evals.    This morning: patient reports feeling tired, and now having b/l LBP. chills, low grade temperature overnight. Patient has received 2 doses of ceftriaxone thus far.   Will continue to monitor, pending bands patient still uncomforatble with chills, and fever, admit for further care. Admit to [facility]

## 2021-05-11 NOTE — H&P ADULT - PROBLEM SELECTOR PLAN 1
- UA positive for nitrites, large leuks, many bacteria  - CT abdomen/pelvis: Left pyelonephritis + Ureteritis   - Urine CX E.coli >100,000  - F/U Blood cx   - on IV Ceftriaxone 1g  - patient reports improvement in dysuria   - monitor fever curve, if febrile will consider broadening abx   - F/U UCX S+S

## 2021-05-11 NOTE — H&P ADULT - RS GEN PE MLT RESP DETAILS PC
breath sounds equal/good air movement/respirations non-labored/clear to auscultation bilaterally/no chest wall tenderness/no rales/no rhonchi

## 2021-05-11 NOTE — H&P ADULT - NEGATIVE NEUROLOGICAL SYMPTOMS
no transient paralysis/no weakness/no paresthesias/no generalized seizures/no focal seizures/no syncope/no loss of sensation/no headache/no loss of consciousness

## 2021-05-11 NOTE — ED CDU PROVIDER SUBSEQUENT DAY NOTE - PHYSICAL EXAMINATION
Vital signs reviewed.   CONSTITUTIONAL: Well-appearing; well-nourished; in no apparent distress. Non-toxic appearing.   HEAD: Normocephalic, atraumatic.  EYES: conjunctiva and sclera WNL.  CARD: Normal S1, S2;   RESP: Normal chest excursion with respiration; breath sounds clear and equal bilaterally; no wheezes, rhonchi, or rales.  ABD/GI: soft, non-distended; +Lt CVAT.   EXT/MS: moves all extremities  SKIN: Normal for age and race  NEURO: Awake, alert, oriented x 3, no gross deficits  - nicole myers

## 2021-05-11 NOTE — ED CDU PROVIDER SUBSEQUENT DAY NOTE - MEDICAL DECISION MAKING DETAILS
26 y.o female with no known PMHx who presents to ED c/o chills, subjective fevers, Lt side flank pain and dysuria. Pt also mentioned CP to ED, pt had recent J&J vaccine 3 weeks ago. Pt arrived tachycardic 110 with rectal temp of 101F. Pt seen and worked up in ED; no leukocytosis. UA+for UTI. CTA chest negative for PE, CT a/p + for Lt side pyelonephritis and ureteritis. Pt given rocephin and IVF in ED. Transferred to CDU for; IVF, IV abx, AM labs, Antipyretic and analgesic prn, vitals q4 and frequent re-evals. Alan: 26 y.o female with no known PMHx who presents to ED c/o chills, subjective fevers, Lt side flank pain and dysuria. Pt also mentioned CP to ED, pt had recent J&J vaccine 3 weeks ago. Pt arrived tachycardic 110 with rectal temp of 101F. Pt seen and worked up in ED; no leukocytosis. UA+for UTI. CTA chest negative for PE, CT a/p + for Lt side pyelonephritis and ureteritis. Pt given rocephin and IVF in ED. Transferred to CDU for; IVF, IV abx, AM labs, Antipyretic and analgesic prn, vitals q4 and frequent re-evals. Alan: 26 y.o female with no known PMHx who presents to ED c/o chills, fevers, Lt side flank pain and dysuria. Pt also mentioned CP to ED, pt had recent J&J vaccine 3 weeks ago. Pt arrived tachycardic 110 with rectal temp of 101F. Pt seen and worked up in ED; no leukocytosis. UA+for UTI. CTA chest negative for PE, CT a/p + for Lt side pyelonephritis and ureteritis. Pt given rocephin and IVF in ED. Transferred to CDU for: IVF, IV abx, AM labs, Antipyretic and analgesic prn, vitals q4 and frequent re-evals.  pt continues to spike fevers despite IV abx, awaiting urine culture sensitivities to ensure treatment efficacy with 3rd gen cephalosporin

## 2021-05-11 NOTE — H&P ADULT - PROBLEM SELECTOR PLAN 4
- patient reported chest pain with dyspnea in ER  - currently denies any complaints  - CT angio no PE  - can check proBNP with AM labs

## 2021-05-11 NOTE — H&P ADULT - HISTORY OF PRESENT ILLNESS
26 year old female no pertinent PMHx presenting with complaint of subjective fever, body aches, chills, lower back pain and dysuria x 3 days. Patient reports she started to have fever and chills on Saturday. She complaints of 7/10 lower back pain described as squeezing. Reports hx of UTI while she was pregnant. In ED pt report chest pain however currently denies. She complaints of dyspnea and having to catch her breath associates it to receiving the J+J vaccine x 3 weeks ago. Denies recent travel, headache, vision change, chest pain, palpitations, abdominal pain, melena, hematochezia, vaginal discharge, numbness, tingling.     In ED vitals: Temp 101.4F HR: 84 BP: 112/66 Sp02: 100% RA. Hgb 10.7 Alk phos 144 AST/ALT=43/148 CT angio chest/abd/pelvis: No PE. Left pyelonephritis. UCx + E.coli >100,000 . Patient is on IV Ceftriaxone

## 2021-05-11 NOTE — H&P ADULT - ASSESSMENT
26 year old female no pertinent PMHx presenting with complaint of subjective fever, body aches, chills, lower back pain and dysuria admitted for Sepsis 2/2 Pyelonephritis

## 2021-05-11 NOTE — ED CDU PROVIDER DISPOSITION NOTE - CLINICAL COURSE
27 y/o F with no known PMHx who presented to ED with Lt side flank pain and dysuria. Pt also mentioned CP to ED, pt had recent J&J vaccine 3 weeks ago. Pt arrived tachycardic 110 with rectal temp of 101F. Pt seen and worked up in ED; no leukocytosis, bandemia 9.7% bands, UA+for UTI. CTA chest negative for PE, CT a/p + for Lt side pyelonephritis and ureteritis. Pt given rocephin and IVF in ED. Transferred to CDU for; IVF, IV abx, AM labs, Antipyretic and analgesic prn, vitals q4 and frequent re-evals.  patient still with pain, fever, will admit for further care to [facility]

## 2021-05-11 NOTE — ED CDU PROVIDER SUBSEQUENT DAY NOTE - CROS ED ROS STATEMENT
Department of Anesthesiology  Postprocedure Note    Patient: Thu Bone  MRN: [id number]  YOB: 1991  Date of evaluation: 7/9/2020  Time:  7:42 AM     Procedure Summary     Date:  07/08/20 Room / Location:      Anesthesia Start:  1614 Anesthesia Stop:  2159    Procedure:  Labor Analgesia Diagnosis:      Scheduled Providers:   Responsible Provider:  Jourdan Villarreal MD    Anesthesia Type:  epidural ASA Status:  2 - Emergent          Anesthesia Type: No value filed. Mason Phase I: Mason Score: 9    Mason Phase II: Mason Score: 10    Last vitals: Reviewed and per EMR flowsheets. Anesthesia Post Evaluation    Level of consciousness: awake  Complications: no  Cardiovascular status: hemodynamically stable  Respiratory status: acceptable  Comments: No apparent complications from neuraxial anesthesia.
all other ROS negative except as per HPI

## 2021-05-11 NOTE — H&P ADULT - NEGATIVE GENERAL SYMPTOMS
no anorexia/no weight loss/no weight gain/no polyuria/no polydipsia no anorexia/no weight loss/no weight gain/no polydipsia

## 2021-05-12 LAB
-  AMIKACIN: SIGNIFICANT CHANGE UP
-  AMOXICILLIN/CLAVULANIC ACID: SIGNIFICANT CHANGE UP
-  AMPICILLIN/SULBACTAM: SIGNIFICANT CHANGE UP
-  AMPICILLIN: SIGNIFICANT CHANGE UP
-  AZTREONAM: SIGNIFICANT CHANGE UP
-  CEFAZOLIN: SIGNIFICANT CHANGE UP
-  CEFEPIME: SIGNIFICANT CHANGE UP
-  CEFOXITIN: SIGNIFICANT CHANGE UP
-  CEFTRIAXONE: SIGNIFICANT CHANGE UP
-  CIPROFLOXACIN: SIGNIFICANT CHANGE UP
-  ERTAPENEM: SIGNIFICANT CHANGE UP
-  GENTAMICIN: SIGNIFICANT CHANGE UP
-  IMIPENEM: SIGNIFICANT CHANGE UP
-  LEVOFLOXACIN: SIGNIFICANT CHANGE UP
-  MEROPENEM: SIGNIFICANT CHANGE UP
-  NITROFURANTOIN: SIGNIFICANT CHANGE UP
-  PIPERACILLIN/TAZOBACTAM: SIGNIFICANT CHANGE UP
-  TIGECYCLINE: SIGNIFICANT CHANGE UP
-  TOBRAMYCIN: SIGNIFICANT CHANGE UP
-  TRIMETHOPRIM/SULFAMETHOXAZOLE: SIGNIFICANT CHANGE UP
A1C WITH ESTIMATED AVERAGE GLUCOSE RESULT: 6 % — HIGH (ref 4–5.6)
ANION GAP SERPL CALC-SCNC: 14 MMOL/L — SIGNIFICANT CHANGE UP (ref 7–14)
BASOPHILS # BLD AUTO: 0.03 K/UL — SIGNIFICANT CHANGE UP (ref 0–0.2)
BASOPHILS NFR BLD AUTO: 0.4 % — SIGNIFICANT CHANGE UP (ref 0–2)
BUN SERPL-MCNC: 10 MG/DL — SIGNIFICANT CHANGE UP (ref 7–23)
CALCIUM SERPL-MCNC: 9.2 MG/DL — SIGNIFICANT CHANGE UP (ref 8.4–10.5)
CHLORIDE SERPL-SCNC: 102 MMOL/L — SIGNIFICANT CHANGE UP (ref 98–107)
CHOLEST SERPL-MCNC: 185 MG/DL — SIGNIFICANT CHANGE UP
CO2 SERPL-SCNC: 23 MMOL/L — SIGNIFICANT CHANGE UP (ref 22–31)
COVID-19 SPIKE DOMAIN AB INTERP: POSITIVE
COVID-19 SPIKE DOMAIN ANTIBODY RESULT: 148 U/ML — HIGH
CREAT SERPL-MCNC: 0.63 MG/DL — SIGNIFICANT CHANGE UP (ref 0.5–1.3)
CULTURE RESULTS: SIGNIFICANT CHANGE UP
EOSINOPHIL # BLD AUTO: 0.13 K/UL — SIGNIFICANT CHANGE UP (ref 0–0.5)
EOSINOPHIL NFR BLD AUTO: 1.6 % — SIGNIFICANT CHANGE UP (ref 0–6)
ESTIMATED AVERAGE GLUCOSE: 126 MG/DL — HIGH (ref 68–114)
FERRITIN SERPL-MCNC: 149 NG/ML — SIGNIFICANT CHANGE UP (ref 15–150)
FOLATE SERPL-MCNC: 15.6 NG/ML — SIGNIFICANT CHANGE UP (ref 3.1–17.5)
GLUCOSE SERPL-MCNC: 92 MG/DL — SIGNIFICANT CHANGE UP (ref 70–99)
HCT VFR BLD CALC: 33.3 % — LOW (ref 34.5–45)
HDLC SERPL-MCNC: 27 MG/DL — LOW
HGB BLD-MCNC: 11 G/DL — LOW (ref 11.5–15.5)
IANC: 5.11 K/UL — SIGNIFICANT CHANGE UP (ref 1.5–8.5)
IMM GRANULOCYTES NFR BLD AUTO: 0.9 % — SIGNIFICANT CHANGE UP (ref 0–1.5)
IRON SATN MFR SERPL: 13 % — LOW (ref 14–50)
IRON SATN MFR SERPL: 30 UG/DL — SIGNIFICANT CHANGE UP (ref 30–160)
LIPID PNL WITH DIRECT LDL SERPL: 129 MG/DL — HIGH
LYMPHOCYTES # BLD AUTO: 1.43 K/UL — SIGNIFICANT CHANGE UP (ref 1–3.3)
LYMPHOCYTES # BLD AUTO: 18 % — SIGNIFICANT CHANGE UP (ref 13–44)
MAGNESIUM SERPL-MCNC: 1.8 MG/DL — SIGNIFICANT CHANGE UP (ref 1.6–2.6)
MCHC RBC-ENTMCNC: 26.1 PG — LOW (ref 27–34)
MCHC RBC-ENTMCNC: 33 GM/DL — SIGNIFICANT CHANGE UP (ref 32–36)
MCV RBC AUTO: 79.1 FL — LOW (ref 80–100)
METHOD TYPE: SIGNIFICANT CHANGE UP
MONOCYTES # BLD AUTO: 1.17 K/UL — HIGH (ref 0–0.9)
MONOCYTES NFR BLD AUTO: 14.7 % — HIGH (ref 2–14)
NEUTROPHILS # BLD AUTO: 5.11 K/UL — SIGNIFICANT CHANGE UP (ref 1.8–7.4)
NEUTROPHILS NFR BLD AUTO: 64.4 % — SIGNIFICANT CHANGE UP (ref 43–77)
NON HDL CHOLESTEROL: 158 MG/DL — HIGH
NRBC # BLD: 0 /100 WBCS — SIGNIFICANT CHANGE UP
NRBC # FLD: 0 K/UL — SIGNIFICANT CHANGE UP
NT-PROBNP SERPL-SCNC: 802 PG/ML — HIGH
ORGANISM # SPEC MICROSCOPIC CNT: SIGNIFICANT CHANGE UP
ORGANISM # SPEC MICROSCOPIC CNT: SIGNIFICANT CHANGE UP
PHOSPHATE SERPL-MCNC: 4.5 MG/DL — SIGNIFICANT CHANGE UP (ref 2.5–4.5)
PLATELET # BLD AUTO: 253 K/UL — SIGNIFICANT CHANGE UP (ref 150–400)
POTASSIUM SERPL-MCNC: 3.5 MMOL/L — SIGNIFICANT CHANGE UP (ref 3.5–5.3)
POTASSIUM SERPL-SCNC: 3.5 MMOL/L — SIGNIFICANT CHANGE UP (ref 3.5–5.3)
RBC # BLD: 4.21 M/UL — SIGNIFICANT CHANGE UP (ref 3.8–5.2)
RBC # BLD: 4.21 M/UL — SIGNIFICANT CHANGE UP (ref 3.8–5.2)
RBC # FLD: 21.2 % — HIGH (ref 10.3–14.5)
RETICS #: 61 K/UL — SIGNIFICANT CHANGE UP (ref 25–125)
RETICS/RBC NFR: 1.5 % — SIGNIFICANT CHANGE UP (ref 0.5–2.5)
SARS-COV-2 IGG+IGM SERPL QL IA: 148 U/ML — HIGH
SARS-COV-2 IGG+IGM SERPL QL IA: POSITIVE
SODIUM SERPL-SCNC: 139 MMOL/L — SIGNIFICANT CHANGE UP (ref 135–145)
SPECIMEN SOURCE: SIGNIFICANT CHANGE UP
TIBC SERPL-MCNC: 231 UG/DL — SIGNIFICANT CHANGE UP (ref 220–430)
TRIGL SERPL-MCNC: 144 MG/DL — SIGNIFICANT CHANGE UP
TSH SERPL-MCNC: 1.83 UIU/ML — SIGNIFICANT CHANGE UP (ref 0.27–4.2)
UIBC SERPL-MCNC: 201 UG/DL — SIGNIFICANT CHANGE UP (ref 110–370)
VIT B12 SERPL-MCNC: 1123 PG/ML — HIGH (ref 200–900)
WBC # BLD: 7.94 K/UL — SIGNIFICANT CHANGE UP (ref 3.8–10.5)
WBC # FLD AUTO: 7.94 K/UL — SIGNIFICANT CHANGE UP (ref 3.8–10.5)

## 2021-05-12 PROCEDURE — 12345: CPT | Mod: NC

## 2021-05-12 PROCEDURE — 99223 1ST HOSP IP/OBS HIGH 75: CPT

## 2021-05-12 RX ORDER — IBUPROFEN 200 MG
600 TABLET ORAL EVERY 6 HOURS
Refills: 0 | Status: DISCONTINUED | OUTPATIENT
Start: 2021-05-12 | End: 2021-05-13

## 2021-05-12 RX ORDER — IBUPROFEN 200 MG
600 TABLET ORAL EVERY 6 HOURS
Refills: 0 | Status: DISCONTINUED | OUTPATIENT
Start: 2021-05-12 | End: 2021-05-12

## 2021-05-12 RX ORDER — SODIUM CHLORIDE 9 MG/ML
1000 INJECTION, SOLUTION INTRAVENOUS
Refills: 0 | Status: COMPLETED | OUTPATIENT
Start: 2021-05-12 | End: 2021-05-12

## 2021-05-12 RX ADMIN — CEFTRIAXONE 100 MILLIGRAM(S): 500 INJECTION, POWDER, FOR SOLUTION INTRAMUSCULAR; INTRAVENOUS at 12:05

## 2021-05-12 RX ADMIN — Medication 650 MILLIGRAM(S): at 06:32

## 2021-05-12 RX ADMIN — Medication 650 MILLIGRAM(S): at 05:40

## 2021-05-12 RX ADMIN — SODIUM CHLORIDE 100 MILLILITER(S): 9 INJECTION, SOLUTION INTRAVENOUS at 12:08

## 2021-05-12 NOTE — PROGRESS NOTE ADULT - ASSESSMENT
26 year old female no pertinent PMHx presenting with complaint of subjective fever, body aches, chills, lower back pain and dysuria admitted for Sepsis 2/2 Pyelonephritis.

## 2021-05-12 NOTE — PATIENT PROFILE ADULT - NSPROPTRIGHTNOTIFY_GEN_A_NUR
declines Zyclara Counseling:  I discussed with the patient the risks of imiquimod including but not limited to erythema, scaling, itching, weeping, crusting, and pain.  Patient understands that the inflammatory response to imiquimod is variable from person to person and was educated regarded proper titration schedule.  If flu-like symptoms develop, patient knows to discontinue the medication and contact us.

## 2021-05-12 NOTE — PROGRESS NOTE ADULT - PROBLEM SELECTOR PLAN 1
UA positive for nitrites, large leukocyte esterase, many bacteria. CT abdomen/pelvis: Left pyelonephritis + Ureteritis. Urine CX E.coli >100,000  - BCX NGTD  - c/w IV Ceftriaxone 1g daily  - monitor fever curve UA positive for nitrites, large leukocyte esterase, many bacteria. CT abdomen/pelvis: Left pyelonephritis + Ureteritis. Urine CX E.coli >100,000  - BCX NGTD  - c/w IV Ceftriaxone 1g daily  - monitor fever curve  - motrin prn for pain

## 2021-05-12 NOTE — PROGRESS NOTE ADULT - SUBJECTIVE AND OBJECTIVE BOX
Patient is a 26y old  Female who presents with a chief complaint of Sepsis 2/2 Pyelonephritis (11 May 2021 23:06)      SUBJECTIVE / OVERNIGHT EVENTS:  There were no acute events overnight.  The patient reports improved left-sided pain.  She has no other complaints.    MEDICATIONS  (STANDING):  cefTRIAXone   IVPB 1000 milliGRAM(s) IV Intermittent every 24 hours    MEDICATIONS  (PRN):  acetaminophen   Tablet .. 650 milliGRAM(s) Oral every 4 hours PRN Temp greater or equal to 38C (100.4F), Moderate Pain (4 - 6)      Vital Signs Last 24 Hrs  T(C): 36.7 (12 May 2021 04:14), Max: 38 (11 May 2021 17:48)  T(F): 98 (12 May 2021 04:14), Max: 100.4 (11 May 2021 17:48)  HR: 80 (12 May 2021 04:14) (66 - 101)  BP: 100/60 (12 May 2021 04:14) (100/60 - 116/79)  BP(mean): --  RR: 16 (12 May 2021 04:14) (16 - 18)  SpO2: 100% (12 May 2021 04:14) (98% - 100%)  CAPILLARY BLOOD GLUCOSE        I&O's Summary      PHYSICAL EXAM:  GENERAL: NAD, well-developed  HEAD:  Atraumatic, Normocephalic  EYES: EOMI, PERRLA, conjunctiva and sclera clear  NECK: Supple, No JVD  CHEST/LUNG: Clear to auscultation bilaterally; No wheezes  HEART: Regular rate and rhythm; No murmurs, rubs, or gallops  ABDOMEN: Soft, tender to palpation on left side, Nondistended; Bowel sounds present  EXTREMITIES:  2+ Peripheral Pulses, No clubbing, cyanosis, or edema  PSYCH: AAOx3  NEUROLOGY: non-focal  SKIN: No rashes or lesions    LABS:                        11.0   7.94  )-----------( 253      ( 12 May 2021 08:08 )             33.3     05-12    139  |  102  |  10  ----------------------------<  92  3.5   |  23  |  0.63    Ca    9.2      12 May 2021 08:08  Phos  4.5     05-12  Mg     1.8     05-12    TPro  6.7  /  Alb  3.1<L>  /  TBili  0.3  /  DBili  x   /  AST  43<H>  /  ALT  148<H>  /  AlkPhos  144<H>  05-11              RADIOLOGY & ADDITIONAL TESTS:    Imaging Personally Reviewed:    Consultant(s) Notes Reviewed:      Care Discussed with Consultants/Other Providers:

## 2021-05-13 ENCOUNTER — TRANSCRIPTION ENCOUNTER (OUTPATIENT)
Age: 26
End: 2021-05-13

## 2021-05-13 VITALS
TEMPERATURE: 98 F | SYSTOLIC BLOOD PRESSURE: 107 MMHG | HEART RATE: 78 BPM | RESPIRATION RATE: 17 BRPM | OXYGEN SATURATION: 99 %

## 2021-05-13 LAB
ALBUMIN SERPL ELPH-MCNC: 3.6 G/DL — SIGNIFICANT CHANGE UP (ref 3.3–5)
ALP SERPL-CCNC: 143 U/L — HIGH (ref 40–120)
ALT FLD-CCNC: 98 U/L — HIGH (ref 4–33)
ANION GAP SERPL CALC-SCNC: 13 MMOL/L — SIGNIFICANT CHANGE UP (ref 7–14)
AST SERPL-CCNC: 34 U/L — HIGH (ref 4–32)
BASOPHILS # BLD AUTO: 0.07 K/UL — SIGNIFICANT CHANGE UP (ref 0–0.2)
BASOPHILS NFR BLD AUTO: 1 % — SIGNIFICANT CHANGE UP (ref 0–2)
BILIRUB SERPL-MCNC: <0.2 MG/DL — SIGNIFICANT CHANGE UP (ref 0.2–1.2)
BUN SERPL-MCNC: 12 MG/DL — SIGNIFICANT CHANGE UP (ref 7–23)
CALCIUM SERPL-MCNC: 9 MG/DL — SIGNIFICANT CHANGE UP (ref 8.4–10.5)
CHLORIDE SERPL-SCNC: 100 MMOL/L — SIGNIFICANT CHANGE UP (ref 98–107)
CO2 SERPL-SCNC: 23 MMOL/L — SIGNIFICANT CHANGE UP (ref 22–31)
CREAT SERPL-MCNC: 0.58 MG/DL — SIGNIFICANT CHANGE UP (ref 0.5–1.3)
EOSINOPHIL # BLD AUTO: 0.36 K/UL — SIGNIFICANT CHANGE UP (ref 0–0.5)
EOSINOPHIL NFR BLD AUTO: 5 % — SIGNIFICANT CHANGE UP (ref 0–6)
GLUCOSE SERPL-MCNC: 188 MG/DL — HIGH (ref 70–99)
HCT VFR BLD CALC: 35.9 % — SIGNIFICANT CHANGE UP (ref 34.5–45)
HGB BLD-MCNC: 11.5 G/DL — SIGNIFICANT CHANGE UP (ref 11.5–15.5)
IANC: 3.67 K/UL — SIGNIFICANT CHANGE UP (ref 1.5–8.5)
IMM GRANULOCYTES NFR BLD AUTO: 1.7 % — HIGH (ref 0–1.5)
LYMPHOCYTES # BLD AUTO: 2.06 K/UL — SIGNIFICANT CHANGE UP (ref 1–3.3)
LYMPHOCYTES # BLD AUTO: 28.9 % — SIGNIFICANT CHANGE UP (ref 13–44)
MAGNESIUM SERPL-MCNC: 2 MG/DL — SIGNIFICANT CHANGE UP (ref 1.6–2.6)
MCHC RBC-ENTMCNC: 25.8 PG — LOW (ref 27–34)
MCHC RBC-ENTMCNC: 32 GM/DL — SIGNIFICANT CHANGE UP (ref 32–36)
MCV RBC AUTO: 80.5 FL — SIGNIFICANT CHANGE UP (ref 80–100)
MONOCYTES # BLD AUTO: 0.86 K/UL — SIGNIFICANT CHANGE UP (ref 0–0.9)
MONOCYTES NFR BLD AUTO: 12 % — SIGNIFICANT CHANGE UP (ref 2–14)
NEUTROPHILS # BLD AUTO: 3.67 K/UL — SIGNIFICANT CHANGE UP (ref 1.8–7.4)
NEUTROPHILS NFR BLD AUTO: 51.4 % — SIGNIFICANT CHANGE UP (ref 43–77)
NRBC # BLD: 0 /100 WBCS — SIGNIFICANT CHANGE UP
NRBC # FLD: 0 K/UL — SIGNIFICANT CHANGE UP
PHOSPHATE SERPL-MCNC: 4 MG/DL — SIGNIFICANT CHANGE UP (ref 2.5–4.5)
PLATELET # BLD AUTO: 324 K/UL — SIGNIFICANT CHANGE UP (ref 150–400)
POTASSIUM SERPL-MCNC: 3.7 MMOL/L — SIGNIFICANT CHANGE UP (ref 3.5–5.3)
POTASSIUM SERPL-SCNC: 3.7 MMOL/L — SIGNIFICANT CHANGE UP (ref 3.5–5.3)
PROT SERPL-MCNC: 7.4 G/DL — SIGNIFICANT CHANGE UP (ref 6–8.3)
RBC # BLD: 4.46 M/UL — SIGNIFICANT CHANGE UP (ref 3.8–5.2)
RBC # FLD: 20.9 % — HIGH (ref 10.3–14.5)
SODIUM SERPL-SCNC: 136 MMOL/L — SIGNIFICANT CHANGE UP (ref 135–145)
WBC # BLD: 7.14 K/UL — SIGNIFICANT CHANGE UP (ref 3.8–10.5)
WBC # FLD AUTO: 7.14 K/UL — SIGNIFICANT CHANGE UP (ref 3.8–10.5)

## 2021-05-13 PROCEDURE — 99239 HOSP IP/OBS DSCHRG MGMT >30: CPT

## 2021-05-13 RX ORDER — CIPROFLOXACIN LACTATE 400MG/40ML
1 VIAL (ML) INTRAVENOUS
Qty: 20 | Refills: 0
Start: 2021-05-13 | End: 2021-05-22

## 2021-05-13 RX ADMIN — CEFTRIAXONE 100 MILLIGRAM(S): 500 INJECTION, POWDER, FOR SOLUTION INTRAMUSCULAR; INTRAVENOUS at 11:16

## 2021-05-13 NOTE — PROGRESS NOTE ADULT - ATTENDING COMMENTS
Pt seen at bedside, feeling much better, has no complaints, flank pain as resolved and pt wants to go home.   26F p/w fever, body aches, chills, lower back pain and dysuria admitted for Sepsis 2/2 Pyelonephritis, c/w IV abx, UCx w/pan-sensitive E. coli. Afebrile overnight, flank pain resolved.   DC today on PO Cipro BID x10 more days for total 14 day therapy.   ~45 minutes
Pt seen at bedside, reports L back pain has improved since admission, fever also has come down.   26F p/w fever, body aches, chills, lower back pain and dysuria admitted for Sepsis 2/2 Pyelonephritis, c/w IV abx, UCx w/pan-sensitive E. coli.   Likely DC in AM on PO regimen if pain is controlled on PO Ibuprofen and pt remains afebrile.

## 2021-05-13 NOTE — PROGRESS NOTE ADULT - PROBLEM SELECTOR PLAN 4
Patient reported chest pain with dyspnea in ED.  - currently asymptomatic  - CT angio with no PE
Patient reported chest pain with dyspnea in ED.  - currently asymptomatic  - CT angio with no PE

## 2021-05-13 NOTE — DISCHARGE NOTE PROVIDER - CARE PROVIDER_API CALL
Tyson Hook  [address] [place] [address]  Phone: [phone]  Fax: (   )    -  Follow Up Time: 1 week

## 2021-05-13 NOTE — PROGRESS NOTE ADULT - SUBJECTIVE AND OBJECTIVE BOX
Patient is a 26y old  Female who presents with a chief complaint of Sepsis 2/2 Pyelonephritis (12 May 2021 12:38)      SUBJECTIVE / OVERNIGHT EVENTS:  Overnight, there were no acute events.  The patient's pain has resolved and she feels well.  Patient is ready to be discharged.    MEDICATIONS  (STANDING):  cefTRIAXone   IVPB 1000 milliGRAM(s) IV Intermittent every 24 hours    MEDICATIONS  (PRN):  ibuprofen  Tablet. 600 milliGRAM(s) Oral every 6 hours PRN Moderate Pain (4 - 6), Severe Pain (7 - 10)      Vital Signs Last 24 Hrs  T(C): 36.8 (13 May 2021 05:23), Max: 37.2 (12 May 2021 20:58)  T(F): 98.2 (13 May 2021 05:23), Max: 98.9 (12 May 2021 20:58)  HR: 86 (13 May 2021 05:23) (72 - 86)  BP: 101/65 (13 May 2021 05:23) (101/65 - 105/64)  BP(mean): --  RR: 17 (13 May 2021 05:23) (17 - 18)  SpO2: 98% (13 May 2021 05:23) (98% - 99%)  CAPILLARY BLOOD GLUCOSE        I&O's Summary      PHYSICAL EXAM:  GENERAL: NAD, well-developed  HEAD:  Atraumatic, Normocephalic  EYES: EOMI, PERRLA, conjunctiva and sclera clear  NECK: Supple, No JVD  CHEST/LUNG: Clear to auscultation bilaterally; No wheezes  HEART: Regular rate and rhythm; No murmurs, rubs, or gallops  ABDOMEN: Soft, Nontender, Nondistended; Bowel sounds present  EXTREMITIES:  2+ Peripheral Pulses, No clubbing, cyanosis, or edema  PSYCH: AAOx3  NEUROLOGY: non-focal  SKIN: No rashes or lesions    LABS:                        11.5   7.14  )-----------( 324      ( 13 May 2021 07:33 )             35.9     05-13    136  |  100  |  12  ----------------------------<  188<H>  3.7   |  23  |  0.58    Ca    9.0      13 May 2021 07:33  Phos  4.0     05-13  Mg     2.0     05-13    TPro  7.4  /  Alb  3.6  /  TBili  <0.2  /  DBili  x   /  AST  34<H>  /  ALT  98<H>  /  AlkPhos  143<H>  05-13              RADIOLOGY & ADDITIONAL TESTS:    Imaging Personally Reviewed:    Consultant(s) Notes Reviewed:      Care Discussed with Consultants/Other Providers:

## 2021-05-13 NOTE — DISCHARGE NOTE PROVIDER - NSDCCPCAREPLAN_GEN_ALL_CORE_FT
PRINCIPAL DISCHARGE DIAGNOSIS  Diagnosis: Pyelonephritis  Assessment and Plan of Treatment: You were admitted because you had an infection of your kidney. You were treated with IV (intravenous) fluids and IV antibiotics. You began to show signs of clinical improvement. Please continue to take the oral antibiotics as prescribed. Please follow up with your primary care physician within one week of discharge. If you begin to experience fever, severe abdominal pain or burning with urination, please return to the Emergency Department as soon as possible.      SECONDARY DISCHARGE DIAGNOSES  Diagnosis: Fatty liver disease, nonalcoholic  Assessment and Plan of Treatment: We did an imaging study of your belly and found that there is some fat in your liver. In order to prevent this from developing into a chronic liver disease, we recommend that you follow some lifestyle changes. Eating a healthy diet that is low in salt and sugar, exercise, weight loss, and abstaing from alcohol use can help prevent worsening of your fatty liver.  - Suggestions to decrease sodium intake include the following the DASH (Dietary Approaches to Stop Hypertension) diet. The DASH diet requires the person to eat four to five servings of fruit, four to five servings of vegetables, and two to three servings of low-fat dairy, and all foods must contain less than 25 percent total fat per serving. Here are some suggestions:  - Look for low-sodium products such as spice blends, and read labels for serving size and sodium content on canned, bottled, and frozen foods  - When dining out, request that the food be prepared without salt, ask for dressings or sauces to be put on the side, and avoid jones bits, cheese, and croutons at the salad bar.  - Do not add salt to food while cooking or before eating. Teach family members to taste food before adding salt.   - Avoid eating at fast food restaurants.   - Look at labels for over-the-counter medications. Avoid products that contain sodium carbonate or sodium bicarbonate. (Sodium bicarbonate is baking soda.)  - Fresh fruits and vegetables are naturally low in sodium. In addition, a diet rich in fruits and vegetables provides additional benefits in lowering blood pressure.

## 2021-05-13 NOTE — PROGRESS NOTE ADULT - PROBLEM SELECTOR PLAN 1
UA positive for nitrites, large leukocyte esterase, many bacteria. CT abdomen/pelvis: Left pyelonephritis + Ureteritis. Urine CX with pan-sensitive E.coli >100,000  - BCX NGTD  - c/w IV Ceftriaxone 1g daily; will transition to po augmentin for discharge.  - monitor fever curve  - motrin prn for pain UA positive for nitrites, large leukocyte esterase, many bacteria. CT abdomen/pelvis: Left pyelonephritis + Ureteritis. Urine CX with pan-sensitive E.coli >100,000  - BCX NGTD  - c/w IV Ceftriaxone 1g daily; will transition to po ciprofloxacin for discharge.  - monitor fever curve  - motrin prn for pain

## 2021-05-13 NOTE — DISCHARGE NOTE PROVIDER - HOSPITAL COURSE
26 year old female no pertinent PMHx presenting with complaint of subjective fever, body aches, chills, lower back pain and dysuria x 3 days. Patient reports she started to have fever and chills on Saturday. She complaints of 7/10 lower back pain described as squeezing. Reports hx of UTI while she was pregnant. In ED pt report chest pain however currently denies. She complaints of dyspnea and having to catch her breath associates it to receiving the J+J vaccine x 3 weeks ago. Denies recent travel, headache, vision change, chest pain, palpitations, abdominal pain, melena, hematochezia, vaginal discharge, numbness, tingling.     In ED vitals: Temp 101.4F HR: 84 BP: 112/66 Sp02: 100% RA. Hgb 10.7 Alk phos 144 AST/ALT=43/148 CT angio chest/abd/pelvis: No PE. Left pyelonephritis. UCx + E.coli >100,000. Patient was started on IV Ceftriaxone.    During her admission, she was treated with four days of IV ceftriaxone. She was also treated with IV fluids. Her UCX showed that the E. coli was sensitive to ciprofloxacin. Because the patient's left-sided pain had resolved and she was doing well clinically, she was discharged with 10 days of ciprofloxacin for a total 14 day course of antibiotics. She was discharged as hemodynamically stable. She will follow up with her PCP.      26 year old female no pertinent PMHx presenting with complaint of subjective fever, body aches, chills, lower back pain and dysuria x 3 days. In ED vitals: Temp 101.4F HR: 84 BP: 112/66 Sp02: 100% RA. Hgb 10.7 Alk phos 144 AST/ALT=43/148 CT angio chest/abd/pelvis: No PE. Left pyelonephritis. UCx + E.coli >100,000. Patient was started on IV Ceftriaxone.    During her admission, she was treated with four days of IV ceftriaxone. She was also treated with IV fluids. Her UCX showed that the E. coli was sensitive to ciprofloxacin. Because the patient's left-sided pain had resolved and she was doing well clinically, she was discharged with 10 days of ciprofloxacin for a total 14 day course of antibiotics. She was discharged as hemodynamically stable. She will follow up with her PCP.

## 2021-05-13 NOTE — DISCHARGE NOTE PROVIDER - PROVIDER TOKENS
FREE:[LAST:[Monster],FIRST:[Tyson],PHONE:[phone]],FAX:[(   )    -],ADDRESS:[[address] [place] [address]],FOLLOWUP:[1 week]]

## 2021-05-13 NOTE — DISCHARGE NOTE NURSING/CASE MANAGEMENT/SOCIAL WORK - PATIENT PORTAL LINK FT
You can access the FollowMyHealth Patient Portal offered by [facility] by registering at the following website: [email or web address]. By joining [employer]’s FollowMyHealth portal, you will also be able to view your health information using other applications (apps) compatible with our system.

## 2021-05-13 NOTE — PROGRESS NOTE ADULT - PROBLEM SELECTOR PLAN 2
Patient meets SIRS criteria with fever and tachycardia. UCX with pan-sensitive E. Coli.    - Continue with IV ABX   - Currently on NS @125cc/hr   - monitor vitals  - BCX NGTD
Patient meets SIRS criteria with fever and tachycardia. UCX with pan-sensitive E. Coli.    - Continue with IV ABX   - monitor vitals  - BCX NGTD

## 2021-05-15 LAB
CULTURE RESULTS: SIGNIFICANT CHANGE UP
CULTURE RESULTS: SIGNIFICANT CHANGE UP
SPECIMEN SOURCE: SIGNIFICANT CHANGE UP
SPECIMEN SOURCE: SIGNIFICANT CHANGE UP

## 2022-08-09 PROBLEM — Z00.00 ENCOUNTER FOR PREVENTIVE HEALTH EXAMINATION: Status: ACTIVE | Noted: 2022-08-09

## 2022-08-09 PROBLEM — Z78.9 OTHER SPECIFIED HEALTH STATUS: Chronic | Status: ACTIVE | Noted: 2021-05-10

## 2022-08-29 ENCOUNTER — APPOINTMENT (OUTPATIENT)
Dept: GASTROENTEROLOGY | Facility: CLINIC | Age: 27
End: 2022-08-29

## 2022-09-07 ENCOUNTER — APPOINTMENT (OUTPATIENT)
Dept: GASTROENTEROLOGY | Facility: CLINIC | Age: 27
End: 2022-09-07

## 2022-09-07 VITALS — BODY MASS INDEX: 31.05 KG/M2 | HEIGHT: 56 IN | WEIGHT: 138 LBS

## 2022-09-07 DIAGNOSIS — Z01.818 ENCOUNTER FOR OTHER PREPROCEDURAL EXAMINATION: ICD-10-CM

## 2022-09-07 DIAGNOSIS — K31.9 DISEASE OF STOMACH AND DUODENUM, UNSPECIFIED: ICD-10-CM

## 2022-09-07 DIAGNOSIS — K31.7 POLYP OF STOMACH AND DUODENUM: ICD-10-CM

## 2022-09-07 PROCEDURE — 99204 OFFICE O/P NEW MOD 45 MIN: CPT

## 2022-09-07 NOTE — REASON FOR VISIT
[Home] : at home, [unfilled] , at the time of the visit. [Medical Office: ([facility])___] : at the medical office located in  [Patient] : the patient [Self] : self [This encounter was initiated by telehealth (audio with video) and converted to telephone (audio only) due to technical difficulties.] : This encounter was initiated by telehealth (audio with video) and converted to telephone (audio only) due to technical difficulties. [Initial Evaluation] : an initial evaluation

## 2022-09-07 NOTE — HISTORY OF PRESENT ILLNESS
[de-identified] : This is a 27 year old female here for an evaluation of duodenal lesion. Referred to us by Dr. Mike Mireles. PMH of pyelonephritis, Pre-DM. Denies a family history of colon CA, gastric CA, high risk polyps, Inflammatory and autoimmune disease. Initially was evaluated by Dr. Mireles for gastritis. EGD for epigastric pain from 7/25/22 showed one large submucosal lesion at the duodenal sweap. Duodenal pathology unremarkable. Denies any difficulty swallowing or reflux. No intermittent nausea but not vomiting. No blood or dark tarry stools. Normal caliber. Gaining weight. \par Med: No Ac or antiplatelet. Rest in chart\par Smoke: none\par Etoh: social\par Allergy: none\par

## 2022-09-07 NOTE — ASSESSMENT
[FreeTextEntry1] : This is a 27 year old female here for an evaluation of duodenal lesion. \par \par My plan \par EGD/EUS within 2-3 weeks for better evaluation of duodenal lesion, possible FNB (1hr) \par Covid swab\par \par Risks, benefits, and alternatives of EGD/EUS and colonoscopy discussed at length with patient including but not limited to bleeding perforation, anesthesia related complication, aspiration, etc. Patient expressed understanding.\par \par EGD/EUS for evaluation of polyps, tumors, nodules with +/- biopsy and or cauterization w/ and or w/o clipping. \par \par \par

## 2022-09-27 ENCOUNTER — TRANSCRIPTION ENCOUNTER (OUTPATIENT)
Age: 27
End: 2022-09-27

## 2022-09-27 ENCOUNTER — APPOINTMENT (OUTPATIENT)
Dept: GASTROENTEROLOGY | Facility: HOSPITAL | Age: 27
End: 2022-09-27

## 2022-09-27 ENCOUNTER — OUTPATIENT (OUTPATIENT)
Dept: OUTPATIENT SERVICES | Facility: HOSPITAL | Age: 27
LOS: 1 days | End: 2022-09-27
Payer: MEDICAID

## 2022-09-27 VITALS
TEMPERATURE: 98 F | SYSTOLIC BLOOD PRESSURE: 108 MMHG | DIASTOLIC BLOOD PRESSURE: 67 MMHG | HEIGHT: 56 IN | OXYGEN SATURATION: 96 % | RESPIRATION RATE: 16 BRPM | HEART RATE: 86 BPM | WEIGHT: 141.98 LBS

## 2022-09-27 VITALS
RESPIRATION RATE: 20 BRPM | OXYGEN SATURATION: 100 % | DIASTOLIC BLOOD PRESSURE: 60 MMHG | SYSTOLIC BLOOD PRESSURE: 107 MMHG | HEART RATE: 89 BPM

## 2022-09-27 DIAGNOSIS — Z98.891 HISTORY OF UTERINE SCAR FROM PREVIOUS SURGERY: Chronic | ICD-10-CM

## 2022-09-27 DIAGNOSIS — K31.7 POLYP OF STOMACH AND DUODENUM: ICD-10-CM

## 2022-09-27 LAB
HCG UR QL: NEGATIVE — SIGNIFICANT CHANGE UP
SARS-COV-2 N GENE NPH QL NAA+PROBE: NOT DETECTED

## 2022-09-27 PROCEDURE — 81025 URINE PREGNANCY TEST: CPT

## 2022-09-27 PROCEDURE — L8699: CPT

## 2022-09-27 PROCEDURE — 43259 EGD US EXAM DUODENUM/JEJUNUM: CPT

## 2022-09-27 DEVICE — KIT ENDO SAFTEY PEG PULL STD 20 FR ENDOVIVE: Type: IMPLANTABLE DEVICE | Status: FUNCTIONAL

## 2022-09-27 NOTE — ASU DISCHARGE PLAN (ADULT/PEDIATRIC) - NS MD DC FALL RISK RISK
For information on Fall & Injury Prevention, visit: [email or web address] OR  [email or web address] OR  https://www.cdc.gov/steadi/patient.html

## 2022-11-16 ENCOUNTER — APPOINTMENT (OUTPATIENT)
Dept: OBGYN | Facility: CLINIC | Age: 27
End: 2022-11-16

## 2023-11-15 ENCOUNTER — EMERGENCY (EMERGENCY)
Facility: HOSPITAL | Age: 28
LOS: 1 days | Discharge: HOME | End: 2023-11-15
Attending: EMERGENCY MEDICINE
Payer: COMMERCIAL

## 2023-11-15 VITALS
HEART RATE: 75 BPM | DIASTOLIC BLOOD PRESSURE: 65 MMHG | OXYGEN SATURATION: 98 % | SYSTOLIC BLOOD PRESSURE: 100 MMHG | WEIGHT: 136.91 LBS | TEMPERATURE: 98 F | RESPIRATION RATE: 18 BRPM

## 2023-11-15 DIAGNOSIS — Z98.891 HISTORY OF UTERINE SCAR FROM PREVIOUS SURGERY: Chronic | ICD-10-CM

## 2023-11-15 LAB
RAPID RVP RESULT: DETECTED
RAPID RVP RESULT: DETECTED
RSV RNA SPEC QL NAA+PROBE: DETECTED
RSV RNA SPEC QL NAA+PROBE: DETECTED
SARS-COV-2 RNA SPEC QL NAA+PROBE: SIGNIFICANT CHANGE UP
SARS-COV-2 RNA SPEC QL NAA+PROBE: SIGNIFICANT CHANGE UP

## 2023-11-15 PROCEDURE — 0225U NFCT DS DNA&RNA 21 SARSCOV2: CPT

## 2023-11-15 PROCEDURE — 99284 EMERGENCY DEPT VISIT MOD MDM: CPT

## 2023-11-15 PROCEDURE — 99283 EMERGENCY DEPT VISIT LOW MDM: CPT

## 2023-11-15 RX ORDER — DEXAMETHASONE 0.5 MG/5ML
8 ELIXIR ORAL ONCE
Refills: 0 | Status: COMPLETED | OUTPATIENT
Start: 2023-11-15 | End: 2023-11-15

## 2023-11-15 RX ORDER — IBUPROFEN 200 MG
600 TABLET ORAL ONCE
Refills: 0 | Status: COMPLETED | OUTPATIENT
Start: 2023-11-15 | End: 2023-11-15

## 2023-11-15 RX ORDER — PSEUDOEPHEDRINE HCL 30 MG
30 TABLET ORAL ONCE
Refills: 0 | Status: COMPLETED | OUTPATIENT
Start: 2023-11-15 | End: 2023-11-15

## 2023-11-15 RX ORDER — IBUPROFEN 200 MG
1 TABLET ORAL
Qty: 28 | Refills: 0
Start: 2023-11-15 | End: 2023-11-21

## 2023-11-15 RX ORDER — PSEUDOEPHEDRINE HCL 30 MG
1 TABLET ORAL
Qty: 10 | Refills: 0
Start: 2023-11-15 | End: 2023-11-19

## 2023-11-15 RX ADMIN — Medication 8 MILLIGRAM(S): at 10:05

## 2023-11-15 RX ADMIN — Medication 30 MILLIGRAM(S): at 10:05

## 2023-11-15 RX ADMIN — Medication 600 MILLIGRAM(S): at 10:34

## 2023-11-15 RX ADMIN — Medication 600 MILLIGRAM(S): at 10:05

## 2023-11-15 NOTE — ED ADULT NURSE NOTE - NSFALLUNIVINTERV_ED_ALL_ED
Bed/Stretcher in lowest position, wheels locked, appropriate side rails in place/Call bell, personal items and telephone in reach/Instruct patient to call for assistance before getting out of bed/chair/stretcher/Non-slip footwear applied when patient is off stretcher/[place] to call system/Physically safe environment - no spills, clutter or unnecessary equipment/Purposeful proactive rounding/Room/bathroom lighting operational, light cord in reach

## 2023-11-15 NOTE — ED PROVIDER NOTE - CPE EDP GASTRO NORM
S/w Dr Delcid Hidden surgery scheduler to coordinate date for joint procedure with Dr Tiffanie Gomez. Holding 4/18 for now. Will anticipate a call back from Dr Delcid Hidden office to confirm. normal...

## 2023-11-15 NOTE — ED PROVIDER NOTE - PATIENT PORTAL LINK FT
You can access the FollowMyHealth Patient Portal offered by [facility] by registering at the following website: [email or web address]. By joining [employer]’s FollowMyHealth portal, you will also be able to view your health information using other applications (apps) compatible with our system.

## 2023-11-15 NOTE — ED PROVIDER NOTE - CLINICAL SUMMARY MEDICAL DECISION MAKING FREE TEXT BOX
18 year old female with rhinorrhea and sore throat. PE as above.  rvp, symptom control, dc. f/u with PMD. return precautions discussed.

## 2023-11-15 NOTE — ED PROVIDER NOTE - NSFOLLOWUPINSTRUCTIONS_ED_ALL_ED_FT
Upper Respiratory Infection, Adult  An upper respiratory infection (URI) is a common viral infection of the nose, throat, and upper air passages that lead to the lungs. The most common type of URI is the common cold. URIs usually get better on their own, without medical treatment.    What are the causes?  A URI is caused by a virus. You may catch a virus by:  Breathing in droplets from an infected person's cough or sneeze.  Touching something that has been exposed to the virus (is contaminated) and then touching your mouth, nose, or eyes.  What increases the risk?  You are more likely to get a URI if:  You are very young or very old.  You have close contact with others, such as at work, school, or a health care facility.  You smoke.  You have long-term (chronic) heart or lung disease.  You have a weakened disease-fighting system (immune system).  You have nasal allergies or asthma.  You are experiencing a lot of stress.  You have poor nutrition.  What are the signs or symptoms?  A URI usually involves some of the following symptoms:  Runny or stuffy (congested) nose.  Cough.  Sneezing.  Sore throat.  Headache.  Fatigue.  Fever.  Loss of appetite.  Pain in your forehead, behind your eyes, and over your cheekbones (sinus pain).  Muscle aches.  Redness or irritation of the eyes.  Pressure in the ears or face.  How is this diagnosed?  This condition may be diagnosed based on your medical history and symptoms, and a physical exam. Your health care provider may use a swab to take a mucus sample from your nose (nasal swab). This sample can be tested to determine what virus is causing the illness.    How is this treated?  URIs usually get better on their own within 7–10 days. Medicines cannot cure URIs, but your health care provider may recommend certain medicines to help relieve symptoms, such as:  Over-the-counter cold medicines.  Cough suppressants. Coughing is a type of defense against infection that helps to clear the respiratory system, so take these medicines only as recommended by your health care provider.  Fever-reducing medicines.  Follow these instructions at home:  Activity    Rest as needed.  If you have a fever, stay home from work or school until your fever is gone or until your health care provider says your URI cannot spread to other people (is no longer contagious). Your health care provider may have you wear a face mask to prevent your infection from spreading.  Relieving symptoms    Gargle with a mixture of salt and water 3–4 times a day or as needed. To make salt water, completely dissolve ½–1 tsp (3–6 g) of salt in 1 cup (237 mL) of warm water.  Use a cool-mist humidifier to add moisture to the air. This can help you breathe more easily.  Eating and drinking    A comparison of three sample cups showing dark yellow, yellow, and pale yellow urine.  Drink enough fluid to keep your urine pale yellow.  Eat soups and other clear broths.  General instructions    A sign showing that a person should not smoke.  Take over-the-counter and prescription medicines only as told by your health care provider. These include cold medicines, fever reducers, and cough suppressants.  Do not use any products that contain nicotine or tobacco. These products include cigarettes, chewing tobacco, and vaping devices, such as e-cigarettes. If you need help quitting, ask your health care provider.  Stay away from secondhand smoke.  Stay up to date on all immunizations, including the yearly (annual) flu vaccine.  Keep all follow-up visits. This is important.  How to prevent the spread of infection to others    Washing hands with soap and water.  URIs can be contagious. To prevent the infection from spreading:  Wash your hands with soap and water for at least 20 seconds. If soap and water are not available, use hand .  Avoid touching your mouth, face, eyes, or nose.  Cough or sneeze into a tissue or your sleeve or elbow instead of into your hand or into the air.  Contact a health care provider if:  You are getting worse instead of better.  You have a fever or chills.  Your mucus is brown or red.  You have yellow or brown discharge coming from your nose.  You have pain in your face, especially when you bend forward.  You have swollen neck glands.  You have pain while swallowing.  You have white areas in the back of your throat.  Get help right away if:  You have shortness of breath that gets worse.  You have severe or persistent:  Headache.  Ear pain.  Sinus pain.  Chest pain.  You have chronic lung disease along with any of the following:  Making high-pitched whistling sounds when you breathe, most often when you breathe out (wheezing).  Prolonged cough (more than 14 days).  Coughing up blood.  A change in your usual mucus.  You have a stiff neck.  You have changes in your:  Vision.  Hearing.  Thinking.  Mood.  These symptoms may be an emergency. Get help right away. Call 911.  Do not wait to see if the symptoms will go away.  Do not drive yourself to the hospital.  Summary  An upper respiratory infection (URI) is a common infection of the nose, throat, and upper air passages that lead to the lungs.  A URI is caused by a virus.  URIs usually get better on their own within 7–10 days.  Medicines cannot cure URIs, but your health care provider may recommend certain medicines to help relieve symptoms.  This information is not intended to replace advice given to you by your health care provider. Make sure you discuss any questions you have with your health care provider.    Document Revised: 07/20/2022 Document Reviewed: 07/20/2022

## 2023-11-15 NOTE — ED PROVIDER NOTE - OBJECTIVE STATEMENT
28 year old female denies PMH coming in with 3 days of rinorrhea and throat pain. family with similar symptoms.

## 2024-04-23 ENCOUNTER — APPOINTMENT (OUTPATIENT)
Dept: GASTROENTEROLOGY | Facility: CLINIC | Age: 29
End: 2024-04-23

## 2025-01-07 NOTE — ED CDU PROVIDER INITIAL DAY NOTE - PROGRESS NOTE DETAILS
CONSTITUTIONAL - well-developed, well-nourished, NAD HEENT - sclerae and conjuntivae appear normal, external nose normal appearance, no nasal discharge NECK - normal appearance, no deformities CHEST - no increased work of breathing, no accessory muscle use CV - no edema, regular rate GI - soft, NTND, no guarding or rigidity, no palpable masses or HSM MSK - no deformities, normal gait SKIN - good turgor, no obvious rashes NEURO - AAOx3 PSYCH - normal mood and appropriate affect Ultrasound results reviewed with patient and Dr. lAan - plan for outpatient GI follow up. Upon awakening from nap, patient had chills and reported not feeling well. Rectal temperature taken - patient found to have 101F fever. Tylenol and Toradol given. Send dose of Rocephin to be hung shortly. Patient will continue to be observed and receive IV antibiotics and reassessment. Patient aware of the plan and has no questions at this time. Patient seen resting comfortable in bed. Vital signs stable. Patient in no acute distress. Reporting improvement of pain, mild discomfort on left flank. Reports waxing and waning of chills. Afebrile orally. Normal S1, S2. Regular rate and rhythm. Lungs clear to auscultation bilaterally. Abdomen soft, nontender, nondistended. +left side CVA tenderness. Review of labs showed transaminitis - no RUQ pain, denies heavy alcohol use. Will get RUQ US, switch to Rocephin q12 hours. Plan to discharge after US and second dose of Rocephin at 2pm. nicole myers: pt resting comfortablyin bed, just finished eating, getting iv abx, states that pain is doing a lot better, will continue iv abx, fluids, and reasses

## 2025-03-26 NOTE — ED ADULT TRIAGE NOTE - NS ED TRIAGE EKG
A hospital bed with a specific mass mattress is a high dollar item which potentially can be billed to Medicare as durable medical equipment.  However, it does require a face-to-face visit documented which is part of the rules for Medicare to prevent fraud and abuse.  So even though she just wants the order for the bed or the mattress it will require  an office visit where that is the only thing it is addressed in the documentation from the visit. EKG completed

## 2025-09-18 ENCOUNTER — EMERGENCY (EMERGENCY)
Facility: HOSPITAL | Age: 30
LOS: 1 days | End: 2025-09-18
Attending: EMERGENCY MEDICINE
Payer: MEDICAID

## 2025-09-18 VITALS
SYSTOLIC BLOOD PRESSURE: 106 MMHG | DIASTOLIC BLOOD PRESSURE: 72 MMHG | RESPIRATION RATE: 17 BRPM | TEMPERATURE: 98 F | HEART RATE: 76 BPM | HEIGHT: 56 IN | OXYGEN SATURATION: 98 % | WEIGHT: 147.71 LBS

## 2025-09-18 DIAGNOSIS — Z98.891 HISTORY OF UTERINE SCAR FROM PREVIOUS SURGERY: Chronic | ICD-10-CM

## 2025-09-18 PROCEDURE — 90471 IMMUNIZATION ADMIN: CPT

## 2025-09-18 PROCEDURE — 90715 TDAP VACCINE 7 YRS/> IM: CPT

## 2025-09-18 PROCEDURE — 99284 EMERGENCY DEPT VISIT MOD MDM: CPT

## 2025-09-18 PROCEDURE — 99283 EMERGENCY DEPT VISIT LOW MDM: CPT | Mod: 25

## 2025-09-18 RX ORDER — AMOXICILLIN AND CLAVULANATE POTASSIUM 500; 125 MG/1; MG/1
1 TABLET, FILM COATED ORAL ONCE
Refills: 0 | Status: COMPLETED | OUTPATIENT
Start: 2025-09-18 | End: 2025-09-18

## 2025-09-18 RX ORDER — AMOXICILLIN AND CLAVULANATE POTASSIUM 500; 125 MG/1; MG/1
1 TABLET, FILM COATED ORAL
Qty: 14 | Refills: 0
Start: 2025-09-18 | End: 2025-09-24

## 2025-09-18 RX ORDER — IBUPROFEN 200 MG
1 TABLET ORAL
Qty: 20 | Refills: 0
Start: 2025-09-18 | End: 2025-09-22

## 2025-09-18 RX ORDER — IBUPROFEN 200 MG
600 TABLET ORAL ONCE
Refills: 0 | Status: COMPLETED | OUTPATIENT
Start: 2025-09-18 | End: 2025-09-18

## 2025-09-18 RX ADMIN — Medication 600 MILLIGRAM(S): at 10:00

## 2025-09-18 RX ADMIN — AMOXICILLIN AND CLAVULANATE POTASSIUM 1 TABLET(S): 500; 125 TABLET, FILM COATED ORAL at 10:00

## (undated) DEVICE — SOLIDIFIER ISOLYZER 2000 CC

## (undated) DEVICE — MASK OXYGEN PANORAMIC

## (undated) DEVICE — STERIS DEFENDO 3-PIECE KIT (AIR/WATER, SUCTION & BIOPSY VALVES)

## (undated) DEVICE — BITE BLOCK MOUTHPCW/STRAP

## (undated) DEVICE — Device

## (undated) DEVICE — FORMALIN CUPS 10% BUFFERED